# Patient Record
Sex: FEMALE | Race: WHITE | Employment: UNEMPLOYED | URBAN - METROPOLITAN AREA
[De-identification: names, ages, dates, MRNs, and addresses within clinical notes are randomized per-mention and may not be internally consistent; named-entity substitution may affect disease eponyms.]

---

## 2017-06-29 ENCOUNTER — HOSPITAL ENCOUNTER (OUTPATIENT)
Dept: CT IMAGING | Age: 47
Discharge: HOME | End: 2017-06-29
Attending: SURGERY
Payer: COMMERCIAL

## 2017-06-29 DIAGNOSIS — R10.9 CHRONIC ABDOMINAL PAIN: ICD-10-CM

## 2017-06-29 DIAGNOSIS — G89.29 CHRONIC ABDOMINAL PAIN: ICD-10-CM

## 2017-06-29 DIAGNOSIS — Z87.19 H/O DIVERTICULITIS OF COLON: ICD-10-CM

## 2017-06-29 PROCEDURE — 74011000258 HC RX REV CODE- 258: Performed by: SURGERY

## 2017-06-29 PROCEDURE — 74011636320 HC RX REV CODE- 636/320: Performed by: SURGERY

## 2017-06-29 PROCEDURE — 74177 CT ABD & PELVIS W/CONTRAST: CPT

## 2017-06-29 RX ORDER — SODIUM CHLORIDE 0.9 % (FLUSH) 0.9 %
10 SYRINGE (ML) INJECTION
Status: COMPLETED | OUTPATIENT
Start: 2017-06-29 | End: 2017-06-29

## 2017-06-29 RX ADMIN — DIATRIZOATE MEGLUMINE AND DIATRIZOATE SODIUM 15 ML: 660; 100 LIQUID ORAL; RECTAL at 12:03

## 2017-06-29 RX ADMIN — SODIUM CHLORIDE 100 ML: 900 INJECTION, SOLUTION INTRAVENOUS at 12:03

## 2017-06-29 RX ADMIN — Medication 10 ML: at 12:03

## 2017-06-29 RX ADMIN — IOPAMIDOL 100 ML: 755 INJECTION, SOLUTION INTRAVENOUS at 12:03

## 2017-07-28 NOTE — PROGRESS NOTES
Louie Barnes,  Please schedule her for an appointment to discuss this. She said she will try to call on Monday. 7/31 to get an appointment. Thank you.

## 2018-02-21 PROBLEM — F33.1 MODERATE EPISODE OF RECURRENT MAJOR DEPRESSIVE DISORDER (HCC): Status: ACTIVE | Noted: 2018-02-21

## 2018-06-04 PROBLEM — K58.0 IRRITABLE BOWEL SYNDROME WITH DIARRHEA: Status: ACTIVE | Noted: 2018-06-04

## 2018-09-13 ENCOUNTER — HOSPITAL ENCOUNTER (OUTPATIENT)
Dept: CT IMAGING | Age: 48
Discharge: HOME | End: 2018-09-13
Attending: FAMILY MEDICINE
Payer: COMMERCIAL

## 2018-09-13 DIAGNOSIS — R10.10 PAIN OF UPPER ABDOMEN: ICD-10-CM

## 2018-09-13 PROCEDURE — 74011636320 HC RX REV CODE- 636/320: Performed by: FAMILY MEDICINE

## 2018-09-13 PROCEDURE — 74011000258 HC RX REV CODE- 258: Performed by: FAMILY MEDICINE

## 2018-09-13 PROCEDURE — 74177 CT ABD & PELVIS W/CONTRAST: CPT

## 2018-09-13 RX ORDER — SODIUM CHLORIDE 0.9 % (FLUSH) 0.9 %
10 SYRINGE (ML) INJECTION
Status: COMPLETED | OUTPATIENT
Start: 2018-09-13 | End: 2018-09-13

## 2018-09-13 RX ADMIN — SODIUM CHLORIDE 100 ML: 900 INJECTION, SOLUTION INTRAVENOUS at 11:42

## 2018-09-13 RX ADMIN — IOPAMIDOL 100 ML: 755 INJECTION, SOLUTION INTRAVENOUS at 11:42

## 2018-09-13 RX ADMIN — Medication 10 ML: at 11:42

## 2018-09-13 RX ADMIN — DIATRIZOATE MEGLUMINE AND DIATRIZOATE SODIUM 15 ML: 660; 100 LIQUID ORAL; RECTAL at 11:42

## 2018-10-11 PROBLEM — K20.90 ESOPHAGITIS: Status: ACTIVE | Noted: 2018-10-11

## 2018-10-11 PROBLEM — R19.7 DIARRHEA: Status: ACTIVE | Noted: 2018-10-11

## 2018-10-11 PROBLEM — L93.0 LUPUS ERYTHEMATOSUS: Status: ACTIVE | Noted: 2018-10-11

## 2018-10-31 ENCOUNTER — HOSPITAL ENCOUNTER (OUTPATIENT)
Dept: PHYSICAL THERAPY | Age: 48
Discharge: HOME | End: 2018-10-31
Payer: COMMERCIAL

## 2018-10-31 DIAGNOSIS — M79.7 FIBROMYALGIA: Chronic | ICD-10-CM

## 2018-10-31 DIAGNOSIS — R53.1 WEAKNESS GENERALIZED: ICD-10-CM

## 2018-10-31 PROCEDURE — 97162 PT EVAL MOD COMPLEX 30 MIN: CPT

## 2018-10-31 PROCEDURE — 97110 THERAPEUTIC EXERCISES: CPT

## 2018-10-31 NOTE — PROGRESS NOTES
April Pace  : 1970  Primary: [facility]*  Secondary:  [id number] at [facility]  [facility], [place], [address] [place] [address]  [phone]   [phone]       OUTPATIENT PHYSICAL THERAPY:Initial Assessment 10/31/2018    ICD-10: Treatment Diagnosis: R53.1 generalized weakness and M79.7 fibromyalgia and R26.89 other abnormalities of gait and mobility   Precautions: weak quads-falls precautions  Allergies: Sulfa (sulfonamide antibiotics); Ciprofloxacin; Ibuprofen; Penicillins; and Prozac [fluoxetine]   Fall Risk Score: 1 (? 5 = High Risk)  MD Orders: evaluate and treat  MEDICAL/REFERRING DIAGNOSIS:  Weakness generalized [R53.1]  Fibromyalgia [M79.7]  DATE OF ONSET:18-patient got sick at the [address] [place] [address] Street: Hermes Burgos MD  RETURN PHYSICIAN APPOINTMENT:unsure     INITIAL ASSESSMENT:  Ms. Uche Pulido is a 53 y.o. female presenting to physical therapy with complaints of generalized weakness but especially with weak legs and fear of falling-[place] for the last 3 1/2 months she has been sick in her stomach with nausea and diarrhea-main complaint is stomach discomfort and fibromyalgia pain in her neck and scapular region-fibro pain is 8/10 at this time but her main concern is her weakness-history of low potassium with her diarrhea issues -unable to keep food down and patient has lost 32 lbs since mid July -LE range of motion is wfl -trunk range of motion is limited in flexion with tight hamstrings and low back soreness-very good candidate for skilled physical therapy to push LE exercises     PROBLEM LIST (Impacting functional limitations):  1. Decreased Strength  2. Decreased ADL/Functional Activities  3. Decreased Transfer Abilities  4. Decreased Ambulation Ability/Technique  5. Decreased Balance  6. Increased Pain  7. Decreased Activity Tolerance  8.  Decreased Pacing Skills  9. Decreased Flexibility/Joint Mobility INTERVENTIONS PLANNED:  1. Manual Therapy  2. Therapeutic Exercise/Strengthening   TREATMENT PLAN:  Effective Dates: 10/31/2018 TO 12/30/2018 (60 days). Frequency/Duration: 2 times a week for 60 Days  GOALS: (Goals have been discussed and agreed upon with patient.)  Short-Term Functional Goals: Time Frame: 11-14-18  1. Pain level is less than 8/10 in scapular region  2. Quad and hamstring strength is improved to 3+/5   Instruction in exercise program to allow increased ADLs. Discharge Goals: Time Frame:12-30-18   1. Neck and shoulder pain is 4/10 with consistent stretching to allow increased home ADLs   2. B quad and hamstring strength is 4-/5 to allow increased home chores and caring for her daughter who has seizures   3. Independent ambulation with no antalgic gait to allow walking community distances  4. Able to stand/walk for more than 1 hour   5. LEFS score is improved from 24/80 to 48/80  Rehabilitation Potential For Stated Goals: Good  Regarding April Gregory's therapy, I certify that the treatment plan above will be carried out by a therapist or under their direction. Thank you for this referral,  Rob Patel PT     Referring Physician Signature: Paige Chapa MD              Date                    The information in this section was collected on 10-31-18 (except where otherwise noted).   HISTORY:   History of Present Injury/Illness (Reason for Referral):  3.5 months of sick to stomach with diarrhea and queasiness has left patient very weak -she has lost 32 lbs per patient -history of diverticulitis  Past Medical History/Comorbidities:   Ms. Delfin Taylor  has a past medical history of Attention deficit disorder without mention of hyperactivity, Bronchitis, Chronic obstructive pulmonary disease ([facility] [address] Depressive disorder, not elsewhere classified, Diaphragmatic hernia without mention of obstruction or gangrene, Diverticulitis of colon (without mention of hemorrhage)(562.11), Endometriosis, Esophageal reflux, Fibromyalgia, Folate deficiency, Generalized hypermobility of joints, GERD (gastroesophageal reflux disease), Hypertension, Hypothyroidism, Irritable bowel syndrome, Leg pain, bilateral, Lupus, Migraine, unspecified, without mention of intractable migraine without mention of status migrainosus, Regional enteritis of large intestine ([facility] [address] Tobacco use disorder, and Vitamin D deficiency. Ms. Agustin Gaucher  has a past surgical history that includes hx lap cholecystectomy (01/04/2017); hx hysterectomy (4/2014); hx colonoscopy (12/2016); hx colonoscopy (01/23/2018); and hx endoscopy (01/23/2018). Social History/Living Environment:   Home Environment: Private residence  Wheelchair Ramp: No  One/Two Story Residence: One story  Living Alone: No  Support Systems: Spouse/Significant Other/Partner  Patient Expects to be Discharged to[de-identified] Private residence  Current DME Used/Available at Home: None  Tub or Shower Type: Tub/Shower combination  Prior Level of Function/Work/Activity:  Independent with home ADLs and walking   Current Medications:       Current Outpatient Medications:     sucralfate (CARAFATE) 1 gram tablet, Dissolve 1 tab in 10ml of water and take PO 4 times every day on an empty stomach 1 hour before meals and at bedtime, Disp: , Rfl:     HYDROcodone-acetaminophen (NORCO)  mg tablet, Take 1 Tab by mouth every eight (8) hours as needed for Pain. Max Daily Amount: 3 Tabs., Disp: 90 Tab, Rfl: 0    dextroamphetamine-amphetamine (ADDERALL) 30 mg tablet, Take 1 Tab by mouth two (2) times a dayEarliest Fill Date: 10/11/18. Max Daily Amount: 2 Tabs, Disp: 60 Tab, Rfl: 0    hyoscyamine SL (LEVSIN/SL) 0.125 mg SL tablet, Take 1 Tab by mouth every six (6) hours as needed for Cramping., Disp: 120 Tab, Rfl: 0    sertraline (ZOLOFT) 50 mg tablet, Take 1 Tab by mouth daily. , Disp: 30 Tab, Rfl: 3    SPIRIVA RESPIMAT 2.5 mcg/actuation inhaler, , Disp: , Rfl:    cyanocobalamin (VITAMIN B12) 1,000 mcg/mL injection, Inject as directed., Disp: 1 Vial, Rfl: 11    Syringe with Needle, Disp, 3 mL 25 gauge x 1\" syrg, Use for B12 shot once a month, Disp: 15 Syringe, Rfl: 0    amLODIPine-benazepril (LOTREL) 10-20 mg per capsule, Take 1 Cap by mouth daily. , Disp: 30 Cap, Rfl: 6    omeprazole (PRILOSEC) 20 mg capsule, TAKE ONE CAPSULE BY MOUTH ONCE DAILY, Disp: 90 Cap, Rfl: 2    carisoprodol (SOMA) 350 mg tablet, Take 1 Tab by mouth every eight (8) hours as needed for Muscle Spasm(s). Max Daily Amount: 1,050 mg., Disp: 90 Tab, Rfl: 0    magnesium oxide (MAG-OX) 400 mg tablet, TAKE ONE TABLET BY MOUTH ONCE DAILY, Disp: 30 Tab, Rfl: 0    promethazine (PHENERGAN) 25 mg tablet, Take 1 Tab by mouth every six (6) hours as needed for Nausea., Disp: 60 Tab, Rfl: 0    folic acid (FOLVITE) 1 mg tablet, Take 1 Tab by mouth daily. Indications: FOLATE DEFICIENCY, Disp: 90 Tab, Rfl: 3    B.infantis-B.ani-B.long-B.bifi (PROBIOTIC 4X) 10-15 mg TbEC, Take  by mouth., Disp: , Rfl:     glucose blood VI test strips (ONETOUCH ULTRA TEST) strip, Testing 2 times daily, Disp: 100 Strip, Rfl: 11    aspirin delayed-release 81 mg tablet, Take  by mouth daily. , Disp: , Rfl:     multivitamin (ONE A DAY) tablet, Take 1 Tab by mouth daily. , Disp: , Rfl:    Date Last Reviewed:  10/31/2018   Number of Personal Factors/Comorbidities that affect the Plan of Care:  (patient has a history of GERD, fibromyalgia, diverticulitis, and lupus) 1-2: MODERATE COMPLEXITY   EXAMINATION:   Observation/Orthostatic Postural Assessment:          Patient ambulates into dept independently with rounded shoulders and forward head on neck posture with flattened lumbar curve  Palpation:          Tight B lumbar paraspinals and point tenderness in B gluteus medius -decreased tone in gastrocsoleus /quad/hamstring musculature  ROM:        L;E range is wfl     Trunk range of motion is wfl with 75% trunk flexion due to tight hamstrings and increased low back soreness  Strength: Ankles are 4-/5    B quads and hamstrings are 3/5 -FALLS PRECAUTIONS     B hip flexors are 4-/5      Special Tests:         Negative spring/compression/valsalva/stork/standing flexion and negative straight leg raise test    Neurological Screen:    No radiating pain or tingling or numbness into LE's    Balance:          Good in sitting    Good- in standing     Mental Status:          Alert and oriented but stressed at this time (her daughter needs 24/7 supervision due to seizure activity)  Sensation:        Intact to light touch    Body Structures Involved:  1. Bones  2. Joints  3. Muscles Body Functions Affected:  1. Sensory/Pain  2. Neuromusculoskeletal  3. Movement Related Activities and Participation Affected:  1. Learning and Applying Knowledge  2. General Tasks and Demands  3. Mobility  4. Self Care  5. Domestic Life   Number of elements (examined above) that affect the Plan of Care: 3: MODERATE COMPLEXITY   CLINICAL PRESENTATION:   Presentation: Evolving clinical presentation with changing clinical characteristics: MODERATE COMPLEXITY   CLINICAL DECISION MAKING:   Outcome Measure: Tool Used: Lower Extremity Functional Scale (LEFS)  Score:  Initial: 24/80 Most Recent: X/80 (Date: -- )   Interpretation of Score: 20 questions each scored on a 5 point scale with 0 representing \"extreme difficulty or unable to perform\" and 4 representing \"no difficulty\". The lower the score, the greater the functional disability. 80/80 represents no disability. Minimal detectable change is 9 points. Score 80 79-63 62-48 47-32 31-16 15-1 0   Modifier CH CI CJ CK CL CM CN         Medical Necessity:   · Patient is expected to demonstrate progress in strength, range of motion, balance and coordination to increase independence with ADLs and improve safety during walking and transfers.   · Skilled intervention continues to be required due to B LE weakness is affecting function and gait .  Reason for Services/Other Comments:  · Patient continues to require modification of therapeutic interventions to increase complexity of exercises. Use of outcome tool(s) and clinical judgement create a POC that gives a:  (outcome measure illustrates up to 79% impairment) Questionable prediction of patient's progress: MODERATE COMPLEXITY            TREATMENT:   (In addition to Assessment/Re-Assessment sessions the following treatments were rendered)  Pre-treatment Symptoms/Complaints:  I took some medication to help me stop smoking in July and I wondered if that started the stomach issues -I can not keep food down an dI have lost weight and my potassium levels have dropped a lot   Pain: Initial:   Pain Intensity 1: 8  Pain Location 1: Spine, cervical, Spine, thoracic  Pain Orientation 1: Mid, Left, Upper, Right  Pain Intervention(s) 1: Exercise  Post Session: 7-8/10 in scapular region from fibromyalgia but her main concern is LE weakness-performed exercises well      Therapeutic Exercise: ( ):  x 40 minutes      Exercises per grid below to improve mobility, strength, balance and coordination. Required minimal verbal cues to promote proper body alignment and promote proper body posture. Progressed resistance, range, repetitions and complexity of movement as indicated.    Date:  10-31-18 Date:   Date:     Activity/Exercise Parameters Parameters Parameters   Gastrocnemius stretch 4 x 30 seconds by therapist     Hamstring stretch 2 x 60 second with belt     Piriformis stretch 2 x 30 seconds     Ankle pumps 2 x 20     Quad sets 2 x 10 with 3 second hold     Gluteal sets X 10 with 3 second hold     Straight leg raises X 10 to each LE       Hip adduction X 10 with yellow theraball     Hip abduction X 20 with black band     Long arc quads X 20 to each LE with 2.5 lb       Seated hamstring curls with eccentric knee extension X 20 to each LE with red band     Marching in place X 10 to each LE with 2.5 lb Standing hamstring curls X 20 to each LE with 2.5 lb     Sit to stand 2 x 5             Manual Therapy (          Level pelvis    Therapeutic Modalities: for pain and edema                                                                                               HEP: As above; handouts given to patient for all exercises. Treatment/Session Assessment:    · Response to Treatment:  Performed exercises well . · Compliance with Program/Exercises: compliant most of the time. · Recommendations/Intent for next treatment session: \"Next visit will focus on advancements to more challenging activities\". push LE strengthening /aerobic work     Total Treatment Duration: exercises x 40 minutes and evaluation x 28 minutes -total treatment time was 68 minutes   PT Patient Time In/Time Out  Time In: 1000  Time Out: EVERETTE Taylor

## 2018-10-31 NOTE — PROGRESS NOTES
Ambulatory/Rehab Services H2 Model Falls Risk Assessment    Risk Factor Pts. ·   Confusion/Disorientation/Impulsivity  []    4 ·   Symptomatic Depression  []   2 ·   Altered Elimination  []   1 ·   Dizziness/Vertigo  []   1 ·   Gender (Male)  []   1 ·   Any administered antiepileptics (anticonvulsants):  []   2 ·   Any administered benzodiazepines:  []   1 ·   Visual Impairment (specify):  []   1 ·   Portable Oxygen Use  []   1 ·   Orthostatic ? BP  []   1 ·   History of Recent Falls (within 3 mos.)  []   5     Ability to Rise from Chair (choose one) Pts. ·   Ability to rise in a single movement  []   0 ·   Pushes up, successful in one attempt  [x]   1 ·   Multiple attempts, but successful  []   3 ·   Unable to rise without assistance  []   4   Total: (5 or greater = High Risk) 1     Falls Prevention Plan:   []                Physical Limitations to Exercise (specify):   []                Mobility Assistance Device (type):   []                Exercise/Equipment Adaptation (specify):    ©2010 [facility] [address] Patent #[id number].  Federal Law prohibits the replication, distribution or use without written permission from [facility] [employer]

## 2018-11-07 ENCOUNTER — HOSPITAL ENCOUNTER (OUTPATIENT)
Dept: PHYSICAL THERAPY | Age: 48
Discharge: HOME | End: 2018-11-07
Payer: COMMERCIAL

## 2018-11-07 PROCEDURE — 97110 THERAPEUTIC EXERCISES: CPT

## 2018-11-07 NOTE — PROGRESS NOTES
April Pace : 1970 [place]: [place] [employer]* Secondary:  Therapy Center at [address], [place], [address] [place] [address] Street [phone]   [phone] OUTPATIENT PHYSICAL THERAPY:Daily Note 2018 ICD-10: Treatment Diagnosis: R53.1 generalized weakness and M79.7 fibromyalgia and R26.89 other abnormalities of gait and mobility Precautions: weak quads-falls precautions Allergies: Sulfa (sulfonamide antibiotics); Ciprofloxacin; Ibuprofen; Penicillins; and Prozac [fluoxetine] Fall Risk Score: 1 (? 5 = High Risk) MD Orders: evaluate and treat  MEDICAL/REFERRING DIAGNOSIS: 
Weakness [R53.1] DATE OF ONSET:18-[place] REFERRING PHYSICIAN: Ashlee Light 75 INITIAL ASSESSMENT:  Ms. Rolan Cline is a 53 y.o. female presenting to physical therapy with complaints of generalized weakness but especially with weak legs and fear of falling-patient [place] and for the last 3 1/2 months she has been sick in her stomach with nausea and diarrhea-main complaint is stomach discomfort and fibromyalgia pain in her neck and scapular region-fibro pain is 8/10 at this time but her main concern is her weakness-history of low potassium with her diarrhea issues -unable to keep food down and patient has lost 32 lbs since mid July -LE range of motion is wfl -trunk range of motion is limited in flexion with tight hamstrings and low back soreness-very good candidate for skilled physical therapy to push LE exercises PROBLEM LIST (Impacting functional limitations): 1. Decreased Strength 2. Decreased ADL/Functional Activities 3. Decreased Transfer Abilities 4. Decreased Ambulation Ability/Technique 5. Decreased Balance 6. Increased Pain 7. Decreased Activity Tolerance 8. Decreased Pacing Skills 9. Decreased Flexibility/Joint Mobility INTERVENTIONS PLANNED: 
1. Manual Therapy 2. Therapeutic Exercise/Strengthening TREATMENT PLAN: 
Effective Dates: 10/31/2018 TO 12/30/2018 (60 days). Frequency/Duration: 2 times a week for 60 Days GOALS: (Goals have been discussed and agreed upon with patient.) Short-Term Functional Goals: Time Frame: 11-14-18 1. Pain level is less than 8/10 in scapular region 2. Quad and hamstring strength is improved to 3+/5 Instruction in exercise program to allow increased ADLs. Discharge Goals: Time Frame:12-30-18 1. Neck and shoulder pain is 4/10 with consistent stretching to allow increased home ADLs 2. B quad and hamstring strength is 4-/5 to allow increased home chores and caring for her daughter who has seizures 3. Independent ambulation with no antalgic gait to allow walking community distances 4. Able to stand/walk for more than 1 hour 5. LEFS score is improved from 24/80 to 48/80 Rehabilitation Potential For Stated Goals: Good Regarding April Gregory's therapy, I certify that the treatment plan above will be carried out by a therapist or under their direction. Thank you for this referral, Ney Benz PT Referring Physician Signature: Seth Mcallister MD            Date The information in this section was collected on 10-31-18 (except where otherwise noted). HISTORY:  
History of Present Injury/Illness (Reason for Referral): 
3.5 months of sick to stomach with diarrhea and queasiness has left patient very weak -she has lost 32 lbs per patient -history of diverticulitis Past Medical History/Comorbidities: Ms. Panda Heller  has a past medical history of Attention deficit disorder without mention of hyperactivity, Bronchitis, Chronic obstructive pulmonary disease ([facility] [address] Depressive disorder, not elsewhere classified, Diaphragmatic hernia without mention of obstruction or gangrene, Diverticulitis of colon (without mention of hemorrhage)(562.11), Endometriosis, Esophageal reflux, Fibromyalgia, Folate deficiency, Generalized hypermobility of joints, GERD (gastroesophageal reflux disease), Hypertension, Hypothyroidism, Irritable bowel syndrome, Leg pain, bilateral, Lupus, Migraine, unspecified, without mention of intractable migraine without mention of status migrainosus, Regional enteritis of large intestine ([facility] [address] Tobacco use disorder, and Vitamin D deficiency. Ms. Angela Albarran  has a past surgical history that includes hx lap cholecystectomy (01/04/2017); hx hysterectomy (4/2014); hx colonoscopy (12/2016); hx colonoscopy (01/23/2018); and hx endoscopy (01/23/2018). Social History/Living Environment:  
  
Prior Level of Function/Work/Activity: 
Independent with home ADLs and walking Current Medications:   
  
Current Outpatient Medications:  
  sucralfate (CARAFATE) 1 gram tablet, Dissolve 1 tab in 10ml of water and take PO 4 times every day on an empty stomach 1 hour before meals and at bedtime, Disp: , Rfl:  
  HYDROcodone-acetaminophen (NORCO)  mg tablet, Take 1 Tab by mouth every eight (8) hours as needed for Pain. Max Daily Amount: 3 Tabs., Disp: 90 Tab, Rfl: 0 
  dextroamphetamine-amphetamine (ADDERALL) 30 mg tablet, Take 1 Tab by mouth two (2) times a dayEarliest Fill Date: 10/11/18. Max Daily Amount: 2 Tabs, Disp: 60 Tab, Rfl: 0 
  hyoscyamine SL (LEVSIN/SL) 0.125 mg SL tablet, Take 1 Tab by mouth every six (6) hours as needed for Cramping., Disp: 120 Tab, Rfl: 0 
  sertraline (ZOLOFT) 50 mg tablet, Take 1 Tab by mouth daily. , Disp: 30 Tab, Rfl: 3 
  SPIRIVA RESPIMAT 2.5 mcg/actuation inhaler, , Disp: , Rfl:  
  cyanocobalamin (VITAMIN B12) 1,000 mcg/mL injection, Inject as directed., Disp: 1 Vial, Rfl: 11   Syringe with Needle, Disp, 3 mL 25 gauge x 1\" syrg, Use for B12 shot once a month, Disp: 15 Syringe, Rfl: 0 
  amLODIPine-benazepril (LOTREL) 10-20 mg per capsule, Take 1 Cap by mouth daily. , Disp: 30 Cap, Rfl: 6   omeprazole (PRILOSEC) 20 mg capsule, TAKE ONE CAPSULE BY MOUTH ONCE DAILY, Disp: 90 Cap, Rfl: 2   carisoprodol (SOMA) 350 mg tablet, Take 1 Tab by mouth every eight (8) hours as needed for Muscle Spasm(s). Max Daily Amount: 1,050 mg., Disp: 90 Tab, Rfl: 0 
  magnesium oxide (MAG-OX) 400 mg tablet, TAKE ONE TABLET BY MOUTH ONCE DAILY, Disp: 30 Tab, Rfl: 0 
  promethazine (PHENERGAN) 25 mg tablet, Take 1 Tab by mouth every six (6) hours as needed for Nausea., Disp: 60 Tab, Rfl: 0 
  folic acid (FOLVITE) 1 mg tablet, Take 1 Tab by mouth daily. Indications: FOLATE DEFICIENCY, Disp: 90 Tab, Rfl: 3 
  B.infantis-B.ani-B.long-B.bifi (PROBIOTIC 4X) 10-15 mg TbEC, Take  by mouth., Disp: , Rfl:  
  glucose blood VI test strips (ONETOUCH ULTRA TEST) strip, Testing 2 times daily, Disp: 100 Strip, Rfl: 11 
  aspirin delayed-release 81 mg tablet, Take  by mouth daily. , Disp: , Rfl:  
  multivitamin (ONE A DAY) tablet, Take 1 Tab by mouth daily. , Disp: , Rfl:   
Date Last Reviewed:  11/7/2018 Number of Personal Factors/Comorbidities that affect the Plan of Care: 
(patient has a history of GERD, fibromyalgia, diverticulitis, and lupus) 1-2: MODERATE COMPLEXITY EXAMINATION:  
Observation/Orthostatic Postural Assessment:   
      Patient ambulates into dept independently with rounded shoulders and forward head on neck posture with flattened lumbar curve Palpation:   
      Tight B lumbar paraspinals and point tenderness in B gluteus medius -decreased tone in gastrocsoleus /quad/hamstring musculature ROM:   
    L;E range is wfl  
 
Trunk range of motion is wfl with 75% trunk flexion due to tight hamstrings and increased low back soreness Strength: Ankles are 4-/5 B quads and hamstrings are 3/5 -FALLS PRECAUTIONS B hip flexors are 4-/5 Special Tests:   
     Negative spring/compression/valsalva/stork/standing flexion and negative straight leg raise test 
 
Neurological Screen: No radiating pain or tingling or numbness into LE's Balance:   
      Good in sitting Good- in standing Mental Status:   
      Alert and oriented but stressed at this time (her daughter needs 24/7 supervision due to seizure activity) Sensation:  
     Intact to light touch Body Structures Involved: 1. Bones 2. Joints 3. Muscles Body Functions Affected: 1. Sensory/Pain 2. Neuromusculoskeletal 
3. Movement Related Activities and Participation Affected: 1. Learning and Applying Knowledge 2. General Tasks and Demands 3. Mobility 4. Self Care 5. Domestic Life Number of elements (examined above) that affect the Plan of Care: 3: MODERATE COMPLEXITY CLINICAL PRESENTATION:  
Presentation: Evolving clinical presentation with changing clinical characteristics: MODERATE COMPLEXITY CLINICAL DECISION MAKING:  
Outcome Measure: Tool Used: Lower Extremity Functional Scale (LEFS) Score:  Initial: 24/80 Most Recent: X/80 (Date: -- ) Interpretation of Score: 20 questions each scored on a 5 point scale with 0 representing \"extreme difficulty or unable to perform\" and 4 representing \"no difficulty\". The lower the score, the greater the functional disability. 80/80 represents no disability. Minimal detectable change is 9 points. Score 80 79-63 62-48 47-32 31-16 15-1 0 Modifier CH CI CJ CK CL CM CN Medical Necessity:  
· Patient is expected to demonstrate progress in strength, range of motion, balance and coordination to increase independence with ADLs and improve safety during walking and transfers. · Skilled intervention continues to be required due to B LE weakness is affecting function and gait . Reason for Services/Other Comments: 
· Patient continues to require modification of therapeutic interventions to increase complexity of exercises.   
Use of outcome tool(s) and clinical judgement create a POC that gives a: 
 (outcome measure illustrates up to 79% impairment) Questionable prediction of patient's progress: MODERATE COMPLEXITY  
  
 
 
 
TREATMENT:  
(In addition to Assessment/Re-Assessment sessions the following treatments were rendered) Pre-treatment Symptoms/Complaints: My fibromyalgia in my neck and shoulders is killing me at a level of 9/10 but I have been doing my exercises to try and get stronger-I have not eaten or slept all day Pain: Initial:  
Pain Intensity 1: 8 Pain Location 1: Spine, cervical, Spine, thoracic Pain Orientation 1: Mid, Left, Right Pain Intervention(s) 1: Exercise  Post Session: 9/10 in scapular region  from fibromyalgia but her main concern is LE weakness-performed increased exercises  exercises well Therapeutic Exercise: ( ):  x 60 minutes Exercises per grid below to improve mobility, strength, balance and coordination. Required minimal verbal cues to promote proper body alignment and promote proper body posture. Progressed resistance, range, repetitions and complexity of movement as indicated. Date: 
10-31-18 Date: 
11-7-18 Date: Activity/Exercise Parameters Parameters Parameters Gastrocnemius stretch 4 x 30 seconds by therapist 2 x 30 second to each LE Hamstring stretch 2 x 60 second with belt 2 X 30 seconds with belt to each LE     
Knee to chest  2 x 30 seconds to each LE    
Piriformis stretch 2 x 30 seconds 2 x 30 seconds Ankle pumps 2 x 20 2 x 20 Quad sets 2 x 10 with 3 second hold 2 x 10 with 3 second hold Gluteal sets X 10 with 3 second hold X 10 with 3 second hold Straight leg raises X 10 to each LE   X 10 to each LE with 2.5 lb Hip adduction X 10 with yellow theraball 2 x 10 with theraball Hip abduction X 20 with black band 2 x 10 with black band Long arc quads X 20 to each LE with 2.5 lb   2 x 10 to each LE with 2.5 lb Seated hamstring curls with eccentric knee extension X 20 to each LE with red band 2 x 10 to each LE with red band Marching in place X 10 to each LE with 2.5 lb 2 x 10 to each LE with 2.5 lb Standing hamstring curls X 20 to each LE with 2.5 lb 2 x 10 to each LE with 2.5 lbs Sit to stand 2 x 5 2 x 5 Standing hip flexion  2 x 10 to each LE with 2.5 lb Standing hip abduction  2 x 10 to each LE with 2.5 lb Standing hip extension  2 x 10 to each LE with 2.5 lbs Nu step  X 5 minutes at level 1 Manual Therapy (       
 
Level pelvis Therapeutic Modalities: for pain and edema HEP: As above; handouts given to patient for all exercises. Treatment/Session Assessment:   
· Response to Treatment:  Performed exercises well including nu step . · BP  And HR after exercises was 124/87   99 · Compliance with Program/Exercises: compliant most of the time. · Recommendations/Intent for next treatment session: \"Next visit will focus on advancements to more challenging activities\". push LE strengthening /aerobic work -add scapular muscle stimulation with heat if fibro pain increases Total Treatment Duration: 60 minutes PT Patient Time In/Time Out Time In: 3292 Time Out: 1630 Taiwo Ryan, PT

## 2018-11-13 ENCOUNTER — HOSPITAL ENCOUNTER (OUTPATIENT)
Dept: PHYSICAL THERAPY | Age: 48
Discharge: HOME | End: 2018-11-13
Payer: COMMERCIAL

## 2018-11-13 PROCEDURE — 97014 ELECTRIC STIMULATION THERAPY: CPT

## 2018-11-13 PROCEDURE — 97110 THERAPEUTIC EXERCISES: CPT

## 2018-11-13 NOTE — PROGRESS NOTES
April Pace : 1970 Primary: [place] [employer]* Secondary:  [facility]rapy Center at [address], [place], [address] [place] [address] Street [phone]   [phone] OUTPATIENT PHYSICAL THERAPY:Daily Note 2018 ICD-10: Treatment Diagnosis: R53.1 generalized weakness and M79.7 fibromyalgia and R26.89 other abnormalities of gait and mobility Precautions: weak quads-falls precautions Allergies: Sulfa (sulfonamide antibiotics); Ciprofloxacin; Ibuprofen; Penicillins; and Prozac [fluoxetine] Fall Risk Score: 1 (? 5 = High Risk) MD Orders: evaluate and treat  MEDICAL/REFERRING DIAGNOSIS: 
Weakness [R53.1] DATE OF ONSET:18-[place] REFERRING PHYSICIAN: Ashlee Olivas INITIAL ASSESSMENT:  Ms. Slade Whitley is a 53 y.o. female presenting to physical therapy with complaints of generalized weakness but especially with weak legs and fear of falling-patient [place] and for the last 3 1/2 months she has been sick in her stomach with nausea and diarrhea-main complaint is stomach discomfort and fibromyalgia pain in her neck and scapular region-fibro pain is 8/10 at this time but her main concern is her weakness-history of low potassium with her diarrhea issues -unable to keep food down and patient has lost 32 lbs since mid July -LE range of motion is wfl -trunk range of motion is limited in flexion with tight hamstrings and low back soreness-very good candidate for skilled physical therapy to push LE exercises PROBLEM LIST (Impacting functional limitations): 1. Decreased Strength 2. Decreased ADL/Functional Activities 3. Decreased Transfer Abilities 4. Decreased Ambulation Ability/Technique 5. Decreased Balance 6. Increased Pain 7. Decreased Activity Tolerance 8. Decreased Pacing Skills 9. Decreased Flexibility/Joint Mobility INTERVENTIONS PLANNED: 
1. Manual Therapy 2. Therapeutic Exercise/Strengthening TREATMENT PLAN: 
Effective Dates: 10/31/2018 TO 12/30/2018 (60 days). Frequency/Duration: 2 times a week for 60 Days GOALS: (Goals have been discussed and agreed upon with patient.) Short-Term Functional Goals: Time Frame: 11-14-18 1. Pain level is less than 8/10 in scapular region 2. Quad and hamstring strength is improved to 3+/5 Instruction in exercise program to allow increased ADLs. Discharge Goals: Time Frame:12-30-18 1. Neck and shoulder pain is 4/10 with consistent stretching to allow increased home ADLs 2. B quad and hamstring strength is 4-/5 to allow increased home chores and caring for her daughter who has seizures 3. Independent ambulation with no antalgic gait to allow walking community distances 4. Able to stand/walk for more than 1 hour 5. LEFS score is improved from 24/80 to 48/80 Rehabilitation Potential For Stated Goals: Good Regarding April Gregory's therapy, I certify that the treatment plan above will be carried out by a therapist or under their direction. Thank you for this referral, Haja Alcantar PTA Referring Physician Signature: Jessi Weaver MD            Date The information in this section was collected on 10-31-18 (except where otherwise noted). HISTORY:  
History of Present Injury/Illness (Reason for Referral): 
3.5 months of sick to stomach with diarrhea and queasiness has left patient very weak -she has lost 32 lbs per patient -history of diverticulitis Past Medical History/Comorbidities: Ms. Teresa Polo  has a past medical history of Attention deficit disorder without mention of hyperactivity, Bronchitis, Chronic obstructive pulmonary disease ([facility] [address] Depressive disorder, not elsewhere classified, Diaphragmatic hernia without mention of obstruction or gangrene, Diverticulitis of colon (without mention of hemorrhage)(562.11), Endometriosis, Esophageal reflux, Fibromyalgia, Folate deficiency, Generalized hypermobility of joints, GERD (gastroesophageal reflux disease), Hypertension, Hypothyroidism, Irritable bowel syndrome, Leg pain, bilateral, Lupus, Migraine, unspecified, without mention of intractable migraine without mention of status migrainosus, Regional enteritis of large intestine ([address] Tobacco use disorder, and Vitamin D deficiency. Ms. Farzana Pendleton  has a past surgical history that includes hx lap cholecystectomy (01/04/2017); hx hysterectomy (4/2014); hx colonoscopy (12/2016); hx colonoscopy (01/23/2018); and hx endoscopy (01/23/2018). Social History/Living Environment:  
  
Prior Level of Function/Work/Activity: 
Independent with home ADLs and walking Current Medications:   
  
Current Outpatient Medications:   Syringe with Needle, Disp, 3 mL 25 gauge x 1\" syrg, Use for B12 shot once a month, Disp: 15 Syringe, Rfl: 0 
  cyanocobalamin (VITAMIN B12) 1,000 mcg/mL injection, Inject as directed., Disp: 1 Vial, Rfl: 11 
  [START ON 11/18/2018] HYDROcodone-acetaminophen (NORCO)  mg tablet, Take 1 Tab by mouth every eight (8) hours as needed for Pain. Max Daily Amount: 3 Tabs., Disp: 90 Tab, Rfl: 0 
  [START ON 11/18/2018] dextroamphetamine-amphetamine (ADDERALL) 30 mg tablet, Take 1 Tab by mouth two (2) times a dayEarliest Fill Date: 11/18/18. Max Daily Amount: 2 Tabs, Disp: 60 Tab, Rfl: 0 
  sucralfate (CARAFATE) 1 gram tablet, Dissolve 1 tab in 10ml of water and take PO 4 times every day on an empty stomach 1 hour before meals and at bedtime, Disp: , Rfl:  
  hyoscyamine SL (LEVSIN/SL) 0.125 mg SL tablet, Take 1 Tab by mouth every six (6) hours as needed for Cramping., Disp: 120 Tab, Rfl: 0 
  sertraline (ZOLOFT) 50 mg tablet, Take 1 Tab by mouth daily. , Disp: 30 Tab, Rfl: 3 
  SPIRIVA RESPIMAT 2.5 mcg/actuation inhaler, , Disp: , Rfl:  
   amLODIPine-benazepril (LOTREL) 10-20 mg per capsule, Take 1 Cap by mouth daily. , Disp: 30 Cap, Rfl: 6 
  omeprazole (PRILOSEC) 20 mg capsule, TAKE ONE CAPSULE BY MOUTH ONCE DAILY, Disp: 90 Cap, Rfl: 2   carisoprodol (SOMA) 350 mg tablet, Take 1 Tab by mouth every eight (8) hours as needed for Muscle Spasm(s). Max Daily Amount: 1,050 mg., Disp: 90 Tab, Rfl: 0 
  magnesium oxide (MAG-OX) 400 mg tablet, TAKE ONE TABLET BY MOUTH ONCE DAILY, Disp: 30 Tab, Rfl: 0 
  promethazine (PHENERGAN) 25 mg tablet, Take 1 Tab by mouth every six (6) hours as needed for Nausea., Disp: 60 Tab, Rfl: 0 
  folic acid (FOLVITE) 1 mg tablet, Take 1 Tab by mouth daily. Indications: FOLATE DEFICIENCY, Disp: 90 Tab, Rfl: 3 
  B.infantis-B.ani-B.long-B.bifi (PROBIOTIC 4X) 10-15 mg TbEC, Take  by mouth., Disp: , Rfl:  
  glucose blood VI test strips (ONETOUCH ULTRA TEST) strip, Testing 2 times daily, Disp: 100 Strip, Rfl: 11 
  aspirin delayed-release 81 mg tablet, Take  by mouth daily. , Disp: , Rfl:  
  multivitamin (ONE A DAY) tablet, Take 1 Tab by mouth daily. , Disp: , Rfl:   
Date Last Reviewed:  11/13/2018 Number of Personal Factors/Comorbidities that affect the Plan of Care: 
(patient has a history of GERD, fibromyalgia, diverticulitis, and lupus) 1-2: MODERATE COMPLEXITY EXAMINATION:  
Observation/Orthostatic Postural Assessment:   
      Patient ambulates into dept independently with rounded shoulders and forward head on neck posture with flattened lumbar curve Palpation:   
      Tight B lumbar paraspinals and point tenderness in B gluteus medius -decreased tone in gastrocsoleus /quad/hamstring musculature ROM:   
    L;E range is wfl  
 
Trunk range of motion is wfl with 75% trunk flexion due to tight hamstrings and increased low back soreness Strength: Ankles are 4-/5 B quads and hamstrings are 3/5 -FALLS PRECAUTIONS B hip flexors are 4-/5 Special Tests: Negative spring/compression/valsalva/stork/standing flexion and negative straight leg raise test 
 
Neurological Screen: No radiating pain or tingling or numbness into LE's Balance:   
      Good in sitting Good- in standing Mental Status:   
      Alert and oriented but stressed at this time (her daughter needs 24/7 supervision due to seizure activity) Sensation:  
     Intact to light touch Body Structures Involved: 1. Bones 2. Joints 3. Muscles Body Functions Affected: 1. Sensory/Pain 2. Neuromusculoskeletal 
3. Movement Related Activities and Participation Affected: 1. Learning and Applying Knowledge 2. General Tasks and Demands 3. Mobility 4. Self Care 5. Domestic Life Number of elements (examined above) that affect the Plan of Care: 3: MODERATE COMPLEXITY CLINICAL PRESENTATION:  
Presentation: Evolving clinical presentation with changing clinical characteristics: MODERATE COMPLEXITY CLINICAL DECISION MAKING:  
Outcome Measure: Tool Used: Lower Extremity Functional Scale (LEFS) Score:  Initial: 24/80 Most Recent: X/80 (Date: -- ) Interpretation of Score: 20 questions each scored on a 5 point scale with 0 representing \"extreme difficulty or unable to perform\" and 4 representing \"no difficulty\". The lower the score, the greater the functional disability. 80/80 represents no disability. Minimal detectable change is 9 points. Score 80 79-63 62-48 47-32 31-16 15-1 0 Modifier CH CI CJ CK CL CM CN Medical Necessity:  
· Patient is expected to demonstrate progress in strength, range of motion, balance and coordination to increase independence with ADLs and improve safety during walking and transfers. · Skilled intervention continues to be required due to B LE weakness is affecting function and gait . Reason for Services/Other Comments: 
· Patient continues to require modification of therapeutic interventions to increase complexity of exercises. Use of outcome tool(s) and clinical judgement create a POC that gives a: 
(outcome measure illustrates up to 79% impairment) Questionable prediction of patient's progress: MODERATE COMPLEXITY  
  
 
 
 
TREATMENT:  
(In addition to Assessment/Re-Assessment sessions the following treatments were rendered) Pre-treatment Symptoms/Complaints: Pt. Stated she has not left her house in months due to her daughter having repeated seizures all day long. Pain: Initial:  
Pain Intensity 1: 9 Pain Location 1: Spine, cervical 
Pain Orientation 1: Left, Mid, Right Pain Intervention(s) 1: Exercise, Heat applied  Post Session: 8/10 less tightness Therapeutic Exercise: (45 Minutes) Exercises per grid below to improve mobility, strength, balance and coordination. Required minimal verbal cues to promote proper body alignment and promote proper body posture. Progressed resistance, range, repetitions and complexity of movement as indicated. Date: 
10-31-18 Date: 
11-7-18 Date: 
11/13/18 Activity/Exercise Parameters Parameters Parameters Gastrocnemius stretch 4 x 30 seconds by therapist 2 x 30 second to each LE 4x30 sec hold Hamstring stretch 2 x 60 second with belt 2 X 30 seconds with belt to each LE   4x30 sec hold strap Knee to chest  2 x 30 seconds to each LE  4x30 sec hold Piriformis stretch 2 x 30 seconds 2 x 30 seconds 4x30 sec hold Ankle pumps 2 x 20 2 x 20 ------  
Quad sets 2 x 10 with 3 second hold 2 x 10 with 3 second hold X 20 reps 5 sec hold Gluteal sets X 10 with 3 second hold X 10 with 3 second hold X 20 reps 5 sec hold Straight leg raises X 10 to each LE   X 10 to each LE with 2.5 lb ------ Hip adduction X 10 with yellow theraball 2 x 10 with theraball X 20 reps x 10 sec hold Hip abduction X 20 with black band 2 x 10 with black band X 20 reps black band Long arc quads X 20 to each LE with 2.5 lb   2 x 10 to each LE with 2.5 lb 2x10 reps each LE with 2.5 lb wt. s Seated hamstring curls with eccentric knee extension X 20 to each LE with red band 2 x 10 to each LE with red band X 20 reps red band 5 sec hold Marching in place X 10 to each LE with 2.5 lb 2 x 10 to each LE with 2.5 lb 2x10 reps each 5 sec hold each Standing hamstring curls X 20 to each LE with 2.5 lb 2 x 10 to each LE with 2.5 lbs 2x10 reps each LE  With 2.5 lb wt.s Sit to stand 2 x 5 2 x 5 X 20 reps chair with blue airex Standing hip flexion  2 x 10 to each LE with 2.5 lb X 20 reps 2.5 lb wt. s Standing hip abduction  2 x 10 to each LE with 2.5 lb 2x10 reps BLE's 2,5 lb wt. s Standing hip extension  2 x 10 to each LE with 2.5 lbs ----- Nu step  X 5 minutes at level 1  X 8 mins level 4 Manual Therapy (      ) Therapeutic Modalities: for pain and edema Cervical Spine Heat Type: Moist pack Duration: 15 minutes Patient Position: Sitting                             Cervical Spine Electrical Stimulation Type: Interferential 
Placement: bilateral cervical paraspinals # of Electrodes: 4 Duration : 15 minutes Patient Position: Sitting HEP: As above; handouts given to patient for all exercises. Treatment/Session Assessment:   
· Response to Treatment:  Pt. Was compliant with all exercises with verbal and tactile cuing for proper technique and body mechanics. · BP  And HR after exercises was 124/87   99 · Compliance with Program/Exercises: compliant most of the time. Recommendations/Intent for next treatment session: \"Next visit will focus on advancements to more challenging activities\". Total Treatment Duration: 60 minutes PT Patient Time In/Time Out Time In: 1430 Time Out: 1530 Edward Chacon PTA

## 2018-11-14 ENCOUNTER — HOSPITAL ENCOUNTER (OUTPATIENT)
Dept: PHYSICAL THERAPY | Age: 48
Discharge: HOME | End: 2018-11-14
Payer: COMMERCIAL

## 2018-11-14 PROCEDURE — 97110 THERAPEUTIC EXERCISES: CPT

## 2018-11-14 PROCEDURE — 97014 ELECTRIC STIMULATION THERAPY: CPT

## 2018-11-14 NOTE — PROGRESS NOTES
April Pace : 1970 [place]: [place] [employer]* Secondary:  Therapy Center at [address], [facility],  [address] [phone]   [phone] OUTPATIENT PHYSICAL THERAPY:Daily Note 2018 ICD-10: Treatment Diagnosis: R53.1 generalized weakness and M79.7 fibromyalgia and R26.89 other abnormalities of gait and mobility Precautions: weak quads-falls precautions Allergies: Sulfa (sulfonamide antibiotics); Ciprofloxacin; Ibuprofen; Penicillins; and Prozac [fluoxetine] Fall Risk Score: 1 (? 5 = High Risk) MD Orders: evaluate and treat  MEDICAL/REFERRING DIAGNOSIS: 
Weakness [R53.1] DATE OF ONSET:18-[place] REFERRING PHYSICIAN: Ashlee Ugalde 76 INITIAL ASSESSMENT:  Ms. Fernanda Simpson is a 53 y.o. female presenting to physical therapy with complaints of generalized weakness but especially with weak legs and fear of falling-patient [place] and for the last 3 1/2 months she has been sick in her stomach with nausea and diarrhea-main complaint is stomach discomfort and fibromyalgia pain in her neck and scapular region-fibro pain is 8/10 at this time but her main concern is her weakness-history of low potassium with her diarrhea issues -unable to keep food down and patient has lost 32 lbs since mid July -LE range of motion is wfl -trunk range of motion is limited in flexion with tight hamstrings and low back soreness-very good candidate for skilled physical therapy to push LE exercises PROBLEM LIST (Impacting functional limitations): 1. Decreased Strength 2. Decreased ADL/Functional Activities 3. Decreased Transfer Abilities 4. Decreased Ambulation Ability/Technique 5. Decreased Balance 6. Increased Pain 7. Decreased Activity Tolerance 8. Decreased Pacing Skills 9. Decreased Flexibility/Joint Mobility INTERVENTIONS PLANNED: 
1. Manual Therapy 2. Therapeutic Exercise/Strengthening TREATMENT PLAN: 
Effective Dates: 10/31/2018 TO 12/30/2018 (60 days). Frequency/Duration: 2 times a week for 60 Days GOALS: (Goals have been discussed and agreed upon with patient.) Short-Term Functional Goals: Time Frame: 11-14-18 1. Pain level is less than 8/10 in scapular region 2. Quad and hamstring strength is improved to 3+/5 Instruction in exercise program to allow increased ADLs. Discharge Goals: Time Frame:12-30-18 1. Neck and shoulder pain is 4/10 with consistent stretching to allow increased home ADLs 2. B quad and hamstring strength is 4-/5 to allow increased home chores and caring for her daughter who has seizures 3. Independent ambulation with no antalgic gait to allow walking community distances 4. Able to stand/walk for more than 1 hour 5. LEFS score is improved from 24/80 to 48/80 Rehabilitation Potential For Stated Goals: Good Regarding April Gregory's therapy, I certify that the treatment plan above will be carried out by a therapist or under their direction. Thank you for this referral, Rob Patel PT Referring Physician Signature: Paige Chapa MD            Date The information in this section was collected on 10-31-18 (except where otherwise noted). HISTORY:  
History of Present Injury/Illness (Reason for Referral): 
3.5 months of sick to stomach with diarrhea and queasiness has left patient very weak -she has lost 32 lbs per patient -history of diverticulitis Past Medical History/Comorbidities: Ms. Delfin Taylor  has a past medical history of Attention deficit disorder without mention of hyperactivity, Bronchitis, Chronic obstructive pulmonary disease ([facility] [address] Depressive disorder, not elsewhere classified, Diaphragmatic hernia without mention of obstruction or gangrene, Diverticulitis of colon (without mention of hemorrhage)(562.11), Endometriosis, Esophageal reflux, Fibromyalgia, Folate deficiency, Generalized hypermobility of joints, GERD (gastroesophageal reflux disease), Hypertension, Hypothyroidism, Irritable bowel syndrome, Leg pain, bilateral, Lupus, Migraine, unspecified, without mention of intractable migraine without mention of status migrainosus, Regional enteritis of large intestine ([address] Tobacco use disorder, and Vitamin D deficiency. Ms. Starr Small  has a past surgical history that includes hx lap cholecystectomy (01/04/2017); hx hysterectomy (4/2014); hx colonoscopy (12/2016); hx colonoscopy (01/23/2018); and hx endoscopy (01/23/2018). Social History/Living Environment:  
  
Prior Level of Function/Work/Activity: 
Independent with home ADLs and walking Current Medications:   
  
Current Outpatient Medications:   Syringe with Needle, Disp, 3 mL 25 gauge x 1\" syrg, Use for B12 shot once a month, Disp: 15 Syringe, Rfl: 0 
  cyanocobalamin (VITAMIN B12) 1,000 mcg/mL injection, Inject as directed., Disp: 1 Vial, Rfl: 11 
  [START ON 11/18/2018] HYDROcodone-acetaminophen (NORCO)  mg tablet, Take 1 Tab by mouth every eight (8) hours as needed for Pain. Max Daily Amount: 3 Tabs., Disp: 90 Tab, Rfl: 0 
  [START ON 11/18/2018] dextroamphetamine-amphetamine (ADDERALL) 30 mg tablet, Take 1 Tab by mouth two (2) times a dayEarliest Fill Date: 11/18/18. Max Daily Amount: 2 Tabs, Disp: 60 Tab, Rfl: 0 
  sucralfate (CARAFATE) 1 gram tablet, Dissolve 1 tab in 10ml of water and take PO 4 times every day on an empty stomach 1 hour before meals and at bedtime, Disp: , Rfl:  
  hyoscyamine SL (LEVSIN/SL) 0.125 mg SL tablet, Take 1 Tab by mouth every six (6) hours as needed for Cramping., Disp: 120 Tab, Rfl: 0 
  sertraline (ZOLOFT) 50 mg tablet, Take 1 Tab by mouth daily. , Disp: 30 Tab, Rfl: 3 
  SPIRIVA RESPIMAT 2.5 mcg/actuation inhaler, , Disp: , Rfl:  
  amLODIPine-benazepril (LOTREL) 10-20 mg per capsule, Take 1 Cap by mouth daily. , Disp: 30 Cap, Rfl: 6 
  omeprazole (PRILOSEC) 20 mg capsule, TAKE ONE CAPSULE BY MOUTH ONCE DAILY, Disp: 90 Cap, Rfl: 2   carisoprodol (SOMA) 350 mg tablet, Take 1 Tab by mouth every eight (8) hours as needed for Muscle Spasm(s). Max Daily Amount: 1,050 mg., Disp: 90 Tab, Rfl: 0 
  magnesium oxide (MAG-OX) 400 mg tablet, TAKE ONE TABLET BY MOUTH ONCE DAILY, Disp: 30 Tab, Rfl: 0 
  promethazine (PHENERGAN) 25 mg tablet, Take 1 Tab by mouth every six (6) hours as needed for Nausea., Disp: 60 Tab, Rfl: 0 
  folic acid (FOLVITE) 1 mg tablet, Take 1 Tab by mouth daily. Indications: FOLATE DEFICIENCY, Disp: 90 Tab, Rfl: 3 
  B.infantis-B.ani-B.long-B.bifi (PROBIOTIC 4X) 10-15 mg TbEC, Take  by mouth., Disp: , Rfl:  
  glucose blood VI test strips (ONETOUCH ULTRA TEST) strip, Testing 2 times daily, Disp: 100 Strip, Rfl: 11 
  aspirin delayed-release 81 mg tablet, Take  by mouth daily. , Disp: , Rfl:  
  multivitamin (ONE A DAY) tablet, Take 1 Tab by mouth daily. , Disp: , Rfl:   
Date Last Reviewed:  11/14/2018 Number of Personal Factors/Comorbidities that affect the Plan of Care: 
(patient has a history of GERD, fibromyalgia, diverticulitis, and lupus) 1-2: MODERATE COMPLEXITY EXAMINATION:  
Observation/Orthostatic Postural Assessment:   
      Patient ambulates into dept independently with rounded shoulders and forward head on neck posture with flattened lumbar curve Palpation:   
      Tight B lumbar paraspinals and point tenderness in B gluteus medius -decreased tone in gastrocsoleus /quad/hamstring musculature ROM:   
    L;E range is wfl  
 
Trunk range of motion is wfl with 75% trunk flexion due to tight hamstrings and increased low back soreness Strength: Ankles are 4-/5 B quads and hamstrings are 3/5 -FALLS PRECAUTIONS B hip flexors are 4-/5 Special Tests:   
     Negative spring/compression/valsalva/stork/standing flexion and negative straight leg raise test 
 
 Neurological Screen: No radiating pain or tingling or numbness into LE's Balance:   
      Good in sitting Good- in standing Mental Status:   
      Alert and oriented but stressed at this time (her daughter needs 24/7 supervision due to seizure activity) Sensation:  
     Intact to light touch Body Structures Involved: 1. Bones 2. Joints 3. Muscles Body Functions Affected: 1. Sensory/Pain 2. Neuromusculoskeletal 
3. Movement Related Activities and Participation Affected: 1. Learning and Applying Knowledge 2. General Tasks and Demands 3. Mobility 4. Self Care 5. Domestic Life Number of elements (examined above) that affect the Plan of Care: 3: MODERATE COMPLEXITY CLINICAL PRESENTATION:  
Presentation: Evolving clinical presentation with changing clinical characteristics: MODERATE COMPLEXITY CLINICAL DECISION MAKING:  
Outcome Measure: Tool Used: Lower Extremity Functional Scale (LEFS) Score:  Initial: 24/80 Most Recent: X/80 (Date: -- ) Interpretation of Score: 20 questions each scored on a 5 point scale with 0 representing \"extreme difficulty or unable to perform\" and 4 representing \"no difficulty\". The lower the score, the greater the functional disability. 80/80 represents no disability. Minimal detectable change is 9 points. Score 80 79-63 62-48 47-32 31-16 15-1 0 Modifier CH CI CJ CK CL CM CN Medical Necessity:  
· Patient is expected to demonstrate progress in strength, range of motion, balance and coordination to increase independence with ADLs and improve safety during walking and transfers. · Skilled intervention continues to be required due to B LE weakness is affecting function and gait . Reason for Services/Other Comments: 
· Patient continues to require modification of therapeutic interventions to increase complexity of exercises.   
Use of outcome tool(s) and clinical judgement create a POC that gives a: 
 (outcome measure illustrates up to 79% impairment) Questionable prediction of patient's progress: MODERATE COMPLEXITY  
  
 
 
 
TREATMENT:  
(In addition to Assessment/Re-Assessment sessions the following treatments were rendered) Pre-treatment Symptoms/Complaints: Pt. Stated the cool wet weather has really aggravated her fibromyalgia in her neck and shoulders-able to work my legs through the fibro pain   
Pain: Initial:  
Pain Intensity 1: 8 Pain Location 1: Spine, cervical, Spine, thoracic Pain Orientation 1: Left, Right, Mid 
Pain Intervention(s) 1: Exercise  Post Session: 6-7/10 less tightness with muscle stimulation Therapeutic Exercise: ( ) x 45 minutes Exercises per grid below to improve mobility, strength, balance and coordination. Required minimal verbal cues to promote proper body alignment and promote proper body posture. Progressed resistance, range, repetitions and complexity of movement as indicated. Date: 
11-14-18 Date: 
11-7-18 Date: 
11/13/18 Activity/Exercise Parameters Parameters Parameters Gastrocnemius stretch 8 x 30 seconds on incline 2 x 30 second to each LE 4x30 sec hold Hamstring stretch  2 X 30 seconds with belt to each LE   4x30 sec hold strap Knee to chest  2 x 30 seconds to each LE  4x30 sec hold Piriformis stretch  2 x 30 seconds 4x30 sec hold Ankle pumps 2 x 20 2 x 20 ------  
Quad sets  2 x 10 with 3 second hold X 20 reps 5 sec hold Gluteal sets  X 10 with 3 second hold X 20 reps 5 sec hold Straight leg raises  X 10 to each LE with 2.5 lb ------ Hip adduction 2 x 10 with yellow theraball 2 x 10 with theraball X 20 reps x 10 sec hold Hip abduction X 20 with black band 2 x 10 with black band X 20 reps black band Long arc quads X 20 to each LE with 3.0 lb   2 x 10 to each LE with 2.5 lb 2x10 reps each LE with 2.5 lb wt. s Seated hamstring curls with eccentric knee extension X 20 to each LE with green  band 2 x 10 to each LE with red band X 20 reps red band 5 sec hold Marching in place 2 X 10 to each LE with 3.0 lb 2 x 10 to each LE with 2.5 lb 2x10 reps each 5 sec hold each Standing hamstring curls X 20 to each LE with 3.0 lb 2 x 10 to each LE with 2.5 lbs 2x10 reps each LE  With 2.5 lb wt.s Sit to stand 2 x 10 2 x 5 X 20 reps chair with blue airex Standing hip flexion X 20 to each LE with 3 lbs 2 x 10 to each LE with 2.5 lb X 20 reps 2.5 lb wt. s Standing hip abduction X 20 to each LE with 3 lbs 2 x 10 to each LE with 2.5 lb 2x10 reps BLE's 2,5 lb wt. s Standing hip extension X 20 to each LE with 3 lbs 2 x 10 to each LE with 2.5 lbs ----- Heel lifts 2 x 20 Nu step X 8 minutes at level 2 X 5 minutes at level 1  X 8 mins level 4 Manual Therapy (      ) Therapeutic Modalities: for pain and edema Cervical Spine Electrical Stimulation Type: Interferential 
Placement: B cervical and mid thoracics # of Electrodes: 4 Duration : 12 minutes Patient Position: Sitting HEP: As above; handouts given to patient for all exercises. Treatment/Session Assessment:   
· Response to Treatment:  Pt. Was compliant with all exercises and received relief to neck and shoulders with muscle stimulation-much less tightness in neck and shoulders following therapy ·   
· Compliance with Program/Exercises: compliant most of the time. Recommendations/Intent for next treatment session: \"Next visit will focus on advancements to more challenging activities\". progress LE/aerobic work -fibro relief activities as tolerated-push more extended posture Total Treatment Duration: 57 minutes Time in-1105 Time out-1202 Mar Stewart, PT

## 2018-11-20 ENCOUNTER — HOSPITAL ENCOUNTER (OUTPATIENT)
Dept: PHYSICAL THERAPY | Age: 48
End: 2018-11-20
Payer: COMMERCIAL

## 2018-11-26 ENCOUNTER — HOSPITAL ENCOUNTER (OUTPATIENT)
Dept: PHYSICAL THERAPY | Age: 48
Discharge: HOME | End: 2018-11-26
Payer: COMMERCIAL

## 2018-11-27 ENCOUNTER — HOSPITAL ENCOUNTER (OUTPATIENT)
Dept: PHYSICAL THERAPY | Age: 48
Discharge: HOME | End: 2018-11-27
Payer: COMMERCIAL

## 2018-12-26 NOTE — PROGRESS NOTES
April Pace  : 1970  Primary: [employer]*  Secondary:  [address] at [facility], Scott lopez, [address]  [phone]   [phone]       OUTPATIENT PHYSICAL [facility] 2018    ICD-10: Treatment Diagnosis: R53.1 generalized weakness and M79.7 fibromyalgia and R26.89 other abnormalities of gait and mobility   Precautions: weak quads-falls precautions  Allergies: Sulfa (sulfonamide antibiotics); Ciprofloxacin; Ibuprofen; Penicillins; and Prozac [fluoxetine]   Fall Risk Score: 1 (? 5 = High Risk)  MD Orders: evaluate and treat  MEDICAL/REFERRING DIAGNOSIS:  Weakness [R53.1]  [place]ET:18[place]  REFERRING PHYSICIAN: Kel Brandon,  [address] ASSESSMENT:  Ms. John Paul Mock is a 53 y.o. female presenting to physical therapy with complaints of generalized weakness but especially with weak legs and fear of falling-patient [place] and for the last 3 1/2 months she has been sick in her stomach with nausea and diarrhea-main complaint is stomach discomfort and fibromyalgia pain in her neck and scapular region-fibro pain is 8/10 at this time but her main concern is her weakness-history of low potassium with her diarrhea issues -unable to keep food down and patient has lost 32 lbs since mid July -LE range of motion is wfl -trunk range of motion is limited in flexion with tight hamstrings and low back soreness-very good candidate for skilled physical therapy to push LE exercises     PROBLEM LIST (Impacting functional limitations):  1. Decreased Strength  2. Decreased ADL/Functional Activities  3. Decreased Transfer Abilities  4. Decreased Ambulation Ability/Technique  5. Decreased Balance  6. Increased Pain  7. Decreased Activity Tolerance  8. Decreased Pacing Skills  9.  Decreased Flexibility/Joint Mobility INTERVENTIONS PLANNED:  1. Manual Therapy  2. Therapeutic Exercise/Strengthening   TREATMENT PLAN:  Effective Dates: 10/31/2018 TO 12/30/2018 (60 days). Frequency/Duration: 2 times a week for 60 Days  GOALS: (Goals have been discussed and agreed upon with patient.)  Short-Term Functional Goals: Time Frame: 11-14-18  1. Pain level is less than 8/10 in scapular region-GOAL MET  2. Quad and hamstring strength is improved to 3+/5 -PROGRESSING  Instruction in exercise program to allow increased ADLs. -GOAL MET  Discharge Goals: Time Frame:12-30-18   1. Neck and shoulder pain is 4/10 with consistent stretching to allow increased home ADLs -GOAL NOT MET  2. B quad and hamstring strength is 4-/5 to allow increased home chores and caring for her daughter who has seizures -GOAL NOT MET  3. Independent ambulation with no antalgic gait to allow walking community distances-GOAL NOT MET  4. Able to stand/walk for more than 1 hour -ONGOING  5. LEFS score is improved from 24/80 to 48/80-ONGOING    Patient was seen for 4 visits of rehab pushing LE strengthening from 10-31-18 to 11-14-18 with patient cancelling her last 3 scheduled appointments -mild pain improvement from 8/10 to 6/10 -mild increased LE strength -independent with home exercise program-most goals not met due to patient only coming for 4 treatments -DC to home program at this time -pleasant lady to work with       Rehabilitation Potential For Stated Goals: Good  Regarding Carolee Jenkins's therapy, I certify that the treatment plan above will be carried out by a therapist or under their direction. Thank you for this referral,  Brenna Crump PT     Referring Physician Signature: Laurel Amin MD              Date                    The information in this section was collected on 10-31-18 (except where otherwise noted).   HISTORY:   History of Present Injury/Illness (Reason for Referral):  3.5 months of sick to stomach with diarrhea and queasiness has left patient very weak -she has lost 32 lbs per patient -history of diverticulitis  Past Medical History/Comorbidities:   Ms. Julio Cesar Encinas  has a past medical history of Attention deficit disorder without mention of hyperactivity, Bronchitis, Chronic obstructive pulmonary disease ([facility] [address] Depressive disorder, not elsewhere classified, Diaphragmatic hernia without mention of obstruction or gangrene, Diverticulitis of colon (without mention of hemorrhage)(562.11), Endometriosis, Esophageal reflux, Fibromyalgia, Folate deficiency, Generalized hypermobility of joints, GERD (gastroesophageal reflux disease), Hypertension, Hypothyroidism, Irritable bowel syndrome, Leg pain, bilateral, Lupus, Migraine, unspecified, without mention of intractable migraine without mention of status migrainosus, Regional enteritis of large intestine ([facility] [address] Tobacco use disorder, and Vitamin D deficiency. Ms. Julio Cesar Encinas  has a past surgical history that includes hx lap cholecystectomy (01/04/2017); hx hysterectomy (4/2014); hx colonoscopy (12/2016); hx colonoscopy (01/23/2018); and hx endoscopy (01/23/2018). Social History/Living Environment:      Prior Level of Function/Work/Activity:  Independent with home ADLs and walking   Current Medications:       Current Outpatient Medications:     carisoprodol (SOMA) 350 mg tablet, Take 1 Tab by mouth every eight (8) hours as needed for Muscle Spasm(s). Max Daily Amount: 1,050 mg., Disp: 90 Tab, Rfl: 0    omeprazole (PRILOSEC) 20 mg capsule, Take 1 Cap by mouth daily. , Disp: 90 Cap, Rfl: 2    sertraline (ZOLOFT) 50 mg tablet, Take 1 Tab by mouth daily. , Disp: 30 Tab, Rfl: 3    Syringe with Needle, Disp, 3 mL 25 gauge x 1\" syrg, Use for B12 shot once a month, Disp: 15 Syringe, Rfl: 0    cyanocobalamin (VITAMIN B12) 1,000 mcg/mL injection, Inject as directed., Disp: 1 Vial, Rfl: 11    HYDROcodone-acetaminophen (NORCO)  mg tablet, Take 1 Tab by mouth every eight (8) hours as needed for Pain.  Max Daily Amount: 3 Tabs., Disp: 90 Tab, Rfl: 0    dextroamphetamine-amphetamine (ADDERALL) 30 mg tablet, Take 1 Tab by mouth two (2) times a dayEarliest Fill Date: 12/20/18. Max Daily Amount: 2 Tabs, Disp: 60 Tab, Rfl: 0    predniSONE (DELTASONE) 5 mg tablet, Take 1 Tab by mouth daily. , Disp: 30 Tab, Rfl: 0    sucralfate (CARAFATE) 1 gram tablet, Dissolve 1 tab in 10ml of water and take PO 4 times every day on an empty stomach 1 hour before meals and at bedtime, Disp: , Rfl:     hyoscyamine SL (LEVSIN/SL) 0.125 mg SL tablet, Take 1 Tab by mouth every six (6) hours as needed for Cramping., Disp: 120 Tab, Rfl: 0    SPIRIVA RESPIMAT 2.5 mcg/actuation inhaler, , Disp: , Rfl:     amLODIPine-benazepril (LOTREL) 10-20 mg per capsule, Take 1 Cap by mouth daily. , Disp: 30 Cap, Rfl: 6    magnesium oxide (MAG-OX) 400 mg tablet, TAKE ONE TABLET BY MOUTH ONCE DAILY, Disp: 30 Tab, Rfl: 0    promethazine (PHENERGAN) 25 mg tablet, Take 1 Tab by mouth every six (6) hours as needed for Nausea., Disp: 60 Tab, Rfl: 0    folic acid (FOLVITE) 1 mg tablet, Take 1 Tab by mouth daily. Indications: FOLATE DEFICIENCY, Disp: 90 Tab, Rfl: 3    B.infantis-B.ani-B.long-B.bifi (PROBIOTIC 4X) 10-15 mg TbEC, Take  by mouth., Disp: , Rfl:     glucose blood VI test strips (ONETOUCH ULTRA TEST) strip, Testing 2 times daily, Disp: 100 Strip, Rfl: 11    aspirin delayed-release 81 mg tablet, Take  by mouth daily. , Disp: , Rfl:     multivitamin (ONE A DAY) tablet, Take 1 Tab by mouth daily. , Disp: , Rfl:    Date Last Reviewed:  12/26/2018   Number of Personal Factors/Comorbidities that affect the Plan of Care:  (patient has a history of GERD, fibromyalgia, diverticulitis, and lupus) 1-2: MODERATE COMPLEXITY   EXAMINATION:   Observation/Orthostatic Postural Assessment:          Patient ambulates into dept independently with rounded shoulders and forward head on neck posture with flattened lumbar curve  Palpation:          Tight B lumbar paraspinals and point tenderness in B gluteus medius -decreased tone in gastrocsoleus /quad/hamstring musculature  ROM:        L;E range is wfl     Trunk range of motion is wfl with 75% trunk flexion due to tight hamstrings and increased low back soreness  Strength: Ankles are 4-/5    B quads and hamstrings are 3/5 -FALLS PRECAUTIONS     B hip flexors are 4-/5      Special Tests:         Negative spring/compression/valsalva/stork/standing flexion and negative straight leg raise test    Neurological Screen:    No radiating pain or tingling or numbness into LE's    Balance:          Good in sitting    Good- in standing     Mental Status:          Alert and oriented but stressed at this time (her daughter needs 24/7 supervision due to seizure activity)  Sensation:        Intact to light touch    Body Structures Involved:  1. Bones  2. Joints  3. Muscles Body Functions Affected:  1. Sensory/Pain  2. Neuromusculoskeletal  3. Movement Related Activities and Participation Affected:  1. Learning and Applying Knowledge  2. General Tasks and Demands  3. Mobility  4. Self Care  5. Domestic Life   Number of elements (examined above) that affect the Plan of Care: 3: MODERATE COMPLEXITY   CLINICAL PRESENTATION:   Presentation: Evolving clinical presentation with changing clinical characteristics: MODERATE COMPLEXITY   CLINICAL DECISION MAKING:   Outcome Measure: Tool Used: Lower Extremity Functional Scale (LEFS)  Score:  Initial: 24/80 Most Recent: X/80 (Date: -- )   Interpretation of Score: 20 questions each scored on a 5 point scale with 0 representing \"extreme difficulty or unable to perform\" and 4 representing \"no difficulty\". The lower the score, the greater the functional disability. 80/80 represents no disability. Minimal detectable change is 9 points.   Score 80 79-63 62-48 47-32 31-16 15-1 0   Modifier CH CI CJ CK CL CM CN         Medical Necessity:   · DC to home exercise program   Reason for Services/Other Comments:  · DC to home program   Use of outcome tool(s) and clinical judgement create a POC that gives a:  (outcome measure illustrates up to 79% impairment) Questionable prediction of patient's progress: MODERATE COMPLEXITY            TREATMENT:   (In addition to Assessment/Re-Assessment sessions the following treatments were rendered)  Pre-treatment Symptoms/Complaints: Pt. Stated the cool wet weather has really aggravated her fibromyalgia in her neck and shoulders-able to work my legs through the fibro pain    Pain: Initial:   Pain Intensity 1: 8  Pain Location 1: Spine, cervical, Spine, thoracic  Pain Orientation 1: Left, Right, Mid  Pain Intervention(s) 1: Exercise  Post Session: 6-7/10 less tightness with muscle stimulation-DC to home program     Therapeutic Exercise: ( )   Exercises per grid below to improve mobility, strength, balance and coordination. Required minimal verbal cues to promote proper body alignment and promote proper body posture. Progressed resistance, range, repetitions and complexity of movement as indicated. Date:  11-14-18 Date:  11-7-18 Date:  11/13/18   Activity/Exercise Parameters Parameters Parameters   Gastrocnemius stretch 8 x 30 seconds on incline 2 x 30 second to each LE 4x30 sec hold    Hamstring stretch  2 X 30 seconds with belt to each LE   4x30 sec hold strap    Knee to chest  2 x 30 seconds to each LE  4x30 sec hold    Piriformis stretch  2 x 30 seconds 4x30 sec hold    Ankle pumps 2 x 20 2 x 20 ------   Quad sets  2 x 10 with 3 second hold X 20 reps 5 sec hold    Gluteal sets  X 10 with 3 second hold X 20 reps 5 sec hold    Straight leg raises  X 10 to each LE with 2.5 lb ------   Hip adduction 2 x 10 with yellow theraball 2 x 10 with theraball X 20 reps x 10 sec hold    Hip abduction X 20 with black band 2 x 10 with black band X 20 reps black band    Long arc quads X 20 to each LE with 3.0 lb   2 x 10 to each LE with 2.5 lb 2x10 reps each LE with 2.5 lb wt. s    Seated hamstring curls with eccentric knee extension X 20 to each LE with green  band 2 x 10 to each LE with red band X 20 reps red band 5 sec hold    Marching in place 2 X 10 to each LE with 3.0 lb 2 x 10 to each LE with 2.5 lb 2x10 reps each 5 sec hold each    Standing hamstring curls X 20 to each LE with 3.0 lb 2 x 10 to each LE with 2.5 lbs 2x10 reps each LE  With 2.5 lb wt.s    Sit to stand 2 x 10 2 x 5 X 20 reps chair with blue airex    Standing hip flexion X 20 to each LE with 3 lbs 2 x 10 to each LE with 2.5 lb X 20 reps 2.5 lb wt. s    Standing hip abduction X 20 to each LE with 3 lbs 2 x 10 to each LE with 2.5 lb 2x10 reps BLE's 2,5 lb wt. s    Standing hip extension X 20 to each LE with 3 lbs 2 x 10 to each LE with 2.5 lbs -----   Heel lifts 2 x 20     Nu step X 8 minutes at level 2 X 5 minutes at level 1  X 8 mins level 4      Manual Therapy (      )        Therapeutic Modalities: for pain and edema                                                                                              HEP: As above; handouts given to patient for all exercises. Treatment/Session Assessment:    · Response to Treatment:  Pt. Was compliant with all exercises and received relief to neck and shoulders with muscle stimulation-much less tightness in neck and shoulders following therapy -DC to home program at this time  ·    · Compliance with Program/Exercises: compliant most of the time.   Recommendations/Intent for next treatment session: DC to home program after 4 visits of therapy pushing LE strengthening -patient is independent with home program     Art Logan, PT

## 2019-01-24 ENCOUNTER — HOSPITAL ENCOUNTER (OUTPATIENT)
Dept: MRI IMAGING | Age: 49
Discharge: HOME | End: 2019-01-24
Attending: FAMILY MEDICINE
Payer: COMMERCIAL

## 2019-01-24 DIAGNOSIS — R51.9 CHRONIC NONINTRACTABLE HEADACHE, UNSPECIFIED HEADACHE TYPE: ICD-10-CM

## 2019-01-24 DIAGNOSIS — R29.898 WEAKNESS OF BOTH LEGS: ICD-10-CM

## 2019-01-24 DIAGNOSIS — M79.605 LEG PAIN, BILATERAL: Chronic | ICD-10-CM

## 2019-01-24 DIAGNOSIS — G89.29 CHRONIC NONINTRACTABLE HEADACHE, UNSPECIFIED HEADACHE TYPE: ICD-10-CM

## 2019-01-24 DIAGNOSIS — M62.569 ATROPHY OF MUSCLE OF LOWER LEG, UNSPECIFIED LATERALITY: ICD-10-CM

## 2019-01-24 DIAGNOSIS — M79.604 LEG PAIN, BILATERAL: Chronic | ICD-10-CM

## 2019-01-24 PROCEDURE — 70553 MRI BRAIN STEM W/O & W/DYE: CPT

## 2019-01-24 PROCEDURE — A9575 INJ GADOTERATE MEGLUMI 0.1ML: HCPCS | Performed by: FAMILY MEDICINE

## 2019-01-24 PROCEDURE — 74011250636 HC RX REV CODE- 250/636: Performed by: FAMILY MEDICINE

## 2019-01-24 PROCEDURE — 72141 MRI NECK SPINE W/O DYE: CPT

## 2019-01-24 RX ORDER — SODIUM CHLORIDE 0.9 % (FLUSH) 0.9 %
10 SYRINGE (ML) INJECTION
Status: COMPLETED | OUTPATIENT
Start: 2019-01-24 | End: 2019-01-24

## 2019-01-24 RX ORDER — GADOTERATE MEGLUMINE 376.9 MG/ML
13 INJECTION INTRAVENOUS
Status: COMPLETED | OUTPATIENT
Start: 2019-01-24 | End: 2019-01-24

## 2019-01-24 RX ADMIN — GADOTERATE MEGLUMINE 13 ML: 376.9 INJECTION INTRAVENOUS at 17:44

## 2019-01-24 RX ADMIN — Medication 10 ML: at 17:44

## 2019-02-27 PROBLEM — R20.2 NUMBNESS AND TINGLING: Status: ACTIVE | Noted: 2019-02-27

## 2019-02-27 PROBLEM — R20.0 NUMBNESS AND TINGLING: Status: ACTIVE | Noted: 2019-02-27

## 2019-02-27 PROBLEM — G62.9 NEUROPATHY: Status: ACTIVE | Noted: 2019-02-27

## 2021-03-16 ENCOUNTER — APPOINTMENT (OUTPATIENT)
Dept: CT IMAGING | Age: 51
End: 2021-03-16
Attending: EMERGENCY MEDICINE
Payer: COMMERCIAL

## 2021-03-16 ENCOUNTER — HOSPITAL ENCOUNTER (EMERGENCY)
Age: 51
Discharge: HOME | End: 2021-03-16
Attending: EMERGENCY MEDICINE
Payer: COMMERCIAL

## 2021-03-16 ENCOUNTER — APPOINTMENT (OUTPATIENT)
Dept: GENERAL RADIOLOGY | Age: 51
End: 2021-03-16
Attending: EMERGENCY MEDICINE
Payer: COMMERCIAL

## 2021-03-16 VITALS
BODY MASS INDEX: 28.51 KG/M2 | WEIGHT: 167 LBS | HEART RATE: 92 BPM | HEIGHT: 64 IN | OXYGEN SATURATION: 95 % | RESPIRATION RATE: 18 BRPM | SYSTOLIC BLOOD PRESSURE: 120 MMHG | TEMPERATURE: 98.4 F | DIASTOLIC BLOOD PRESSURE: 85 MMHG

## 2021-03-16 DIAGNOSIS — R06.02 SOB (SHORTNESS OF BREATH): Primary | ICD-10-CM

## 2021-03-16 LAB
ALBUMIN SERPL-MCNC: 3.4 G/DL (ref 3.5–5)
ALBUMIN/GLOB SERPL: 1 {RATIO} (ref 1.2–3.5)
ALP SERPL-CCNC: 87 U/L (ref 50–136)
ALT SERPL-CCNC: 25 U/L (ref 12–65)
ANION GAP SERPL CALC-SCNC: 4 MMOL/L (ref 7–16)
AST SERPL-CCNC: 20 U/L (ref 15–37)
ATRIAL RATE: 104 BPM
BASOPHILS # BLD: 0 K/UL (ref 0–0.2)
BASOPHILS NFR BLD: 1 % (ref 0–2)
BILIRUB SERPL-MCNC: 0.5 MG/DL (ref 0.2–1.1)
BUN SERPL-MCNC: 11 MG/DL (ref 6–23)
CALCIUM SERPL-MCNC: 9.1 MG/DL (ref 8.3–10.4)
CALCULATED P AXIS, ECG09: 58 DEGREES
CALCULATED R AXIS, ECG10: 42 DEGREES
CALCULATED T AXIS, ECG11: 50 DEGREES
CHLORIDE SERPL-SCNC: 104 MMOL/L (ref 98–107)
CO2 SERPL-SCNC: 32 MMOL/L (ref 21–32)
CREAT SERPL-MCNC: 0.84 MG/DL (ref 0.6–1)
D DIMER PPP FEU-MCNC: 0.42 UG/ML(FEU)
DIAGNOSIS, 93000: NORMAL
DIFFERENTIAL METHOD BLD: ABNORMAL
EOSINOPHIL # BLD: 0.3 K/UL (ref 0–0.8)
EOSINOPHIL NFR BLD: 4 % (ref 0.5–7.8)
ERYTHROCYTE [DISTWIDTH] IN BLOOD BY AUTOMATED COUNT: 13.8 % (ref 11.9–14.6)
GLOBULIN SER CALC-MCNC: 3.3 G/DL (ref 2.3–3.5)
GLUCOSE SERPL-MCNC: 111 MG/DL (ref 65–100)
HCT VFR BLD AUTO: 42.4 % (ref 35.8–46.3)
HGB BLD-MCNC: 14.2 G/DL (ref 11.7–15.4)
IMM GRANULOCYTES # BLD AUTO: 0 K/UL (ref 0–0.5)
IMM GRANULOCYTES NFR BLD AUTO: 0 % (ref 0–5)
LYMPHOCYTES # BLD: 2.5 K/UL (ref 0.5–4.6)
LYMPHOCYTES NFR BLD: 34 % (ref 13–44)
MCH RBC QN AUTO: 31.2 PG (ref 26.1–32.9)
MCHC RBC AUTO-ENTMCNC: 33.5 G/DL (ref 31.4–35)
MCV RBC AUTO: 93.2 FL (ref 79.6–97.8)
MONOCYTES # BLD: 0.3 K/UL (ref 0.1–1.3)
MONOCYTES NFR BLD: 4 % (ref 4–12)
NEUTS SEG # BLD: 4.1 K/UL (ref 1.7–8.2)
NEUTS SEG NFR BLD: 57 % (ref 43–78)
NRBC # BLD: 0 K/UL (ref 0–0.2)
P-R INTERVAL, ECG05: 174 MS
PLATELET # BLD AUTO: 325 K/UL (ref 150–450)
PMV BLD AUTO: 8.9 FL (ref 9.4–12.3)
POTASSIUM SERPL-SCNC: 3.3 MMOL/L (ref 3.5–5.1)
PROT SERPL-MCNC: 6.7 G/DL (ref 6.3–8.2)
Q-T INTERVAL, ECG07: 364 MS
QRS DURATION, ECG06: 74 MS
QTC CALCULATION (BEZET), ECG08: 478 MS
RBC # BLD AUTO: 4.55 M/UL (ref 4.05–5.2)
SODIUM SERPL-SCNC: 140 MMOL/L (ref 136–145)
TROPONIN-HIGH SENSITIVITY: 5 PG/ML (ref 0–14)
TROPONIN-HIGH SENSITIVITY: 5.8 PG/ML (ref 0–14)
VENTRICULAR RATE, ECG03: 104 BPM
WBC # BLD AUTO: 7.2 K/UL (ref 4.3–11.1)

## 2021-03-16 PROCEDURE — 74011000636 HC RX REV CODE- 636: Performed by: EMERGENCY MEDICINE

## 2021-03-16 PROCEDURE — 71045 X-RAY EXAM CHEST 1 VIEW: CPT

## 2021-03-16 PROCEDURE — 80053 COMPREHEN METABOLIC PANEL: CPT

## 2021-03-16 PROCEDURE — 74011250636 HC RX REV CODE- 250/636: Performed by: EMERGENCY MEDICINE

## 2021-03-16 PROCEDURE — 96361 HYDRATE IV INFUSION ADD-ON: CPT

## 2021-03-16 PROCEDURE — 93005 ELECTROCARDIOGRAM TRACING: CPT | Performed by: EMERGENCY MEDICINE

## 2021-03-16 PROCEDURE — 96360 HYDRATION IV INFUSION INIT: CPT

## 2021-03-16 PROCEDURE — 74011000258 HC RX REV CODE- 258: Performed by: EMERGENCY MEDICINE

## 2021-03-16 PROCEDURE — 71260 CT THORAX DX C+: CPT

## 2021-03-16 PROCEDURE — 85025 COMPLETE CBC W/AUTO DIFF WBC: CPT

## 2021-03-16 PROCEDURE — 85379 FIBRIN DEGRADATION QUANT: CPT

## 2021-03-16 PROCEDURE — 99284 EMERGENCY DEPT VISIT MOD MDM: CPT

## 2021-03-16 PROCEDURE — 84484 ASSAY OF TROPONIN QUANT: CPT

## 2021-03-16 RX ORDER — SODIUM CHLORIDE 0.9 % (FLUSH) 0.9 %
10 SYRINGE (ML) INJECTION
Status: COMPLETED | OUTPATIENT
Start: 2021-03-16 | End: 2021-03-16

## 2021-03-16 RX ADMIN — Medication 10 ML: at 19:49

## 2021-03-16 RX ADMIN — IOPAMIDOL 100 ML: 755 INJECTION, SOLUTION INTRAVENOUS at 19:49

## 2021-03-16 RX ADMIN — SODIUM CHLORIDE 500 ML: 900 INJECTION, SOLUTION INTRAVENOUS at 20:49

## 2021-03-16 RX ADMIN — SODIUM CHLORIDE 100 ML: 900 INJECTION, SOLUTION INTRAVENOUS at 19:49

## 2021-03-16 NOTE — ED PROVIDER NOTES
Patient with chronic pain and COPD. Went to [place] to get some medications adjusted. Ended up being admitted to the hospital and diagnosed with a pulmonary embolism. Started on Xarelto and discharged Friday. Drove back here over the weekend and got in this morning. Has not had any of her Xarelto filled or taking it over the weekend. Has felt more short of breath with questionable irregular heartbeat but no chest pain. No cough or congestion. Recently changed her insurance and is unable to follow-up with her pulmonologist with [facility]. Here for evaluation. The history is provided by the patient. No  was used. Shortness of Breath  This is a new problem. The problem occurs continuously. The current episode started more than 2 days ago. The problem has been gradually worsening. Pertinent negatives include no fever, no headaches, no rhinorrhea, no sore throat, no neck pain, no cough, no sputum production, no wheezing, no orthopnea, no chest pain, no vomiting, no abdominal pain, no rash, no leg pain and no leg swelling. She has tried nothing for the symptoms. She has had prior hospitalizations. Associated medical issues include COPD and PE.         Past Medical History:   Diagnosis Date    Attention deficit disorder without mention of hyperactivity     Bronchitis     Chronic obstructive pulmonary disease (HCC)     Depressive disorder, not elsewhere classified     Diaphragmatic hernia without mention of obstruction or gangrene     Diverticulitis of colon (without mention of hemorrhage)(562.11)     Endometriosis     Esophageal reflux     Fibromyalgia     Folate deficiency     Generalized hypermobility of joints     GERD (gastroesophageal reflux disease)     Hypertension     Hypothyroidism     Irritable bowel syndrome     Leg pain, bilateral 12/9/2015    Lupus ([facility] [address]     Migraine, unspecified, without mention of intractable migraine without mention of status migrainosus     [facility]     Tobacco use disorder     Vitamin D deficiency        Past Surgical History:   Procedure Laterality Date    HX COLONOSCOPY  12/2016    HX COLONOSCOPY  01/23/2018    diverticulosis, int and ext hemorrhoids    HX ENDOSCOPY  01/23/2018    polyp in duodenum, chronic gastritis with hemorrhage    HX HYSTERECTOMY  4/2014    HX LAP CHOLECYSTECTOMY  01/04/2017         Family History:   Problem Relation Age of Onset    Heart Attack Father     Diabetes Paternal Grandfather     Hypertension Mother     Diabetes Mother     Thyroid Disease Mother     Psychiatric Disorder Mother         anxiety    Lung Disease Other         aunt with \"rare type of lung disorder, required Oxygen\"    Seizures Child        Social History     Socioeconomic History    Marital status:      Spouse name: Not on file    Number of children: Not on file    Years of education: Not on file    Highest education level: Not on file   Occupational History    Not on file   Social Needs    Financial resource strain: Not on file    Food insecurity     Worry: Not on file     Inability: Not on file    Transportation needs     Medical: Not on file     Non-medical: Not on file   Tobacco Use    Smoking status: Current Every Day Smoker     Packs/day: 0.50     Years: 30.00     Pack years: 15.00    Smokeless tobacco: Never Used    Tobacco comment: She said that she does not smoke all the time.  [facility] 9/19/19   Substance and Sexual Activity    Alcohol use: No     Alcohol/week: 0.0 standard drinks     Comment: quit 2 years ago    Drug use: Not Currently    Sexual activity: Yes     Partners: Male     Birth control/protection: Surgical   Lifestyle    Physical activity     Days per week: Not on file     Minutes per session: Not on file    Stress: Not on file   Relationships    Social connections     Talks on phone: Not on file     Gets together: Not on file     Attends [employer] service: Not on file Active member of club or organization: Not on file     Attends meetings of clubs or organizations: Not on file     Relationship status: Not on file    Intimate partner violence     Fear of current or ex partner: Not on file     Emotionally abused: Not on file     Physically abused: Not on file     Forced sexual activity: Not on file   Other Topics Concern    Not on file   Social History Narrative    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 She is . She has always lived in [place]. No history of toxic industrial, environmental, asbestos or TB exposure. ALLERGIES: Sulfa (sulfonamide antibiotics), Bactrim [sulfamethoprim], Ciprofloxacin, Ibuprofen, Penicillins, and Prozac [fluoxetine]    Review of Systems   Constitutional: Negative for chills and fever. HENT: Negative for rhinorrhea and sore throat. Eyes: Negative for pain and redness. Respiratory: Positive for shortness of breath. Negative for cough, sputum production, chest tightness and wheezing. Cardiovascular: Positive for palpitations. Negative for chest pain, orthopnea and leg swelling. Gastrointestinal: Negative for abdominal pain, diarrhea, nausea and vomiting. Genitourinary: Negative for dysuria and hematuria. Musculoskeletal: Negative for back pain, gait problem, neck pain and neck stiffness. Skin: Negative for color change and rash. Neurological: Negative for weakness, numbness and headaches. Vitals:    03/16/21 1823   BP: (!) 135/93   Pulse: 100   Resp: 18   Temp: 98.4 °F (36.9 °C)   SpO2: 97%   Weight: 75.8 kg (167 lb)   Height: 5' 4\" (1.626 m)            Physical Exam  Constitutional:       Appearance: Normal appearance. She is well-developed. HENT:      Head: Normocephalic and atraumatic. Neck:      Musculoskeletal: Normal range of motion and neck supple. Cardiovascular:      Rate and Rhythm: Normal rate. Rhythm irregular.       Comments: Occasional dropped beats. Pulmonary:      Effort: Pulmonary effort is normal. No respiratory distress. Breath sounds: Normal breath sounds. No wheezing. Abdominal:      General: Bowel sounds are normal.      Palpations: Abdomen is soft. Tenderness: There is no abdominal tenderness. Musculoskeletal: Normal range of motion. General: No swelling. Skin:     General: Skin is warm and dry. Neurological:      Mental Status: She is alert and oriented to person, place, and time. MDM  Number of Diagnoses or Management Options  Diagnosis management comments: Patient recently diagnosed with a small right-sided pulmonary embolism on CT from [place]. Unable to view those images. CT here does not show any PE. Discussed with radiology with this new knowledge and once again confirms no PE. Patient is not hypoxic. Patient had lower extremity duplex ultrasounds ruling out DVT in [place]. Has not had any blood thinners over the weekend to today. Discussed with pulmonary who recommends getting a D-dimer. If this is negative then very unlikely patient has a PE of any significance and does not need any blood thinners. If positive will restart patient's Xarelto. With D-dimer negative will discharge with no blood thinner and follow-up with pulmonary. Amount and/or Complexity of Data Reviewed  Clinical lab tests: ordered and reviewed  Tests in the radiology section of CPT®: ordered and reviewed  Tests in the medicine section of CPT®: ordered and reviewed    Patient Progress  Patient progress: stable         Procedures        EKG: nonspecific ST and T waves changes, sinus tachycardia. Rate 104.         CT CHEST W CONT (Edited Result - FINAL)  Result time 03/16/21 21:05:38  Addendum 1 of 1 by Patsy Blair MD (03/16/21 21:05:38)    Addendum:      This case was discussed with emergency department physician caring for the  patient who received a report from a recent out-[place]-state CT describing a small  right-sided pulmonary embolism. A second review of the right lung does not  confirm an occlusive embolus on this scan.                  XR CHEST PORT (Final result)  Result time 03/16/21 18:49:57  Final result by Keny Brower Jr., MD (03/16/21 18:49:57)                Impression:    No consolidation.            Narrative:    AP PORTABLE CHEST X-RAY 3/16/2021 6:46 PM     HISTORY: shob  HX----ER PT W/ acute moderate and increasing shortness of breath,   recent DX of PE last week     COMPARISON: None     FINDINGS: There is no lobar consolidation, pleural effusions or pulmonary edema.                   Results Include:    Recent Results (from the past 24 hour(s))   EKG, 12 LEAD, INITIAL    Collection Time: 03/16/21  6:24 PM   Result Value Ref Range    Ventricular Rate 104 BPM    Atrial Rate 104 BPM    P-R Interval 174 ms    QRS Duration 74 ms    Q-T Interval 364 ms    QTC Calculation (Bezet) 478 ms    Calculated P Axis 58 degrees    Calculated R Axis 42 degrees    Calculated T Axis 50 degrees    Diagnosis       Sinus tachycardia with occasional Premature ventricular complexes  Low voltage QRS  Cannot rule out Anterior infarct , age undetermined  Abnormal ECG  No previous ECGs available     CBC WITH AUTOMATED DIFF    Collection Time: 03/16/21  6:27 PM   Result Value Ref Range    WBC 7.2 4.3 - 11.1 K/uL    RBC 4.55 4.05 - 5.2 M/uL    HGB 14.2 11.7 - 15.4 g/dL    HCT 42.4 35.8 - 46.3 %    MCV 93.2 79.6 - 97.8 FL    MCH 31.2 26.1 - 32.9 PG    MCHC 33.5 31.4 - 35.0 g/dL    RDW 13.8 11.9 - 14.6 %    PLATELET 325 150 - 450 K/uL    MPV 8.9 (L) 9.4 - 12.3 FL    ABSOLUTE NRBC 0.00 0.0 - 0.2 K/uL    DF AUTOMATED      NEUTROPHILS 57 43 - 78 %    LYMPHOCYTES 34 13 - 44 %    MONOCYTES 4 4.0 - 12.0 %    EOSINOPHILS 4 0.5 - 7.8 %    BASOPHILS 1 0.0 - 2.0 %    IMMATURE GRANULOCYTES 0 0.0 - 5.0 %    ABS. NEUTROPHILS 4.1 1.7 - 8.2 K/UL    ABS. LYMPHOCYTES 2.5 0.5 - 4.6 K/UL    ABS. MONOCYTES 0.3 0.1 - 1.3 K/UL    ABS.  EOSINOPHILS 0.3 0.0 - 0.8 K/UL    ABS. BASOPHILS 0.0 0.0 - 0.2 K/UL    ABS. IMM. GRANS. 0.0 0.0 - 0.5 K/UL   METABOLIC PANEL, COMPREHENSIVE    Collection Time: 03/16/21  6:27 PM   Result Value Ref Range    Sodium 140 136 - 145 mmol/L    Potassium 3.3 (L) 3.5 - 5.1 mmol/L    Chloride 104 98 - 107 mmol/L    CO2 32 21 - 32 mmol/L    Anion gap 4 (L) 7 - 16 mmol/L    Glucose 111 (H) 65 - 100 mg/dL    BUN 11 6 - 23 MG/DL    Creatinine 0.84 0.6 - 1.0 MG/DL    GFR est AA >60 >60 ml/min/1.73m2    GFR est non-AA >60 >60 ml/min/1.73m2    Calcium 9.1 8.3 - 10.4 MG/DL    Bilirubin, total 0.5 0.2 - 1.1 MG/DL    ALT (SGPT) 25 12 - 65 U/L    AST (SGOT) 20 15 - 37 U/L    Alk.  phosphatase 87 50 - 136 U/L    Protein, total 6.7 6.3 - 8.2 g/dL    Albumin 3.4 (L) 3.5 - 5.0 g/dL    Globulin 3.3 2.3 - 3.5 g/dL    A-G Ratio 1.0 (L) 1.2 - 3.5     TROPONIN-HIGH SENSITIVITY    Collection Time: 03/16/21  6:27 PM   Result Value Ref Range    Troponin-High Sensitivity 5.0 0 - 14 pg/mL

## 2021-03-16 NOTE — ED TRIAGE NOTES
Pt arrived via POV c/o shob that has been increasing over the past few days. Pt reports that she was in the hospital in [place] and d/c with PE in her lungs. Pt was given a blood thinner in the hospital but has not filled her script here yet. Reports chest pressure, cough and fatigue. Denies NVD.

## 2021-03-17 NOTE — ED NOTES
I have reviewed discharge instructions with the patient. The patient verbalized understanding. Patient left ED via Discharge Method: ambulatory to Home with . Opportunity for questions and clarification provided. Patient given 0 scripts. To continue your aftercare when you leave the hospital, you may receive an automated call from our care team to check in on how you are doing. This is a free service and part of our promise to provide the best care and service to meet your aftercare needs.  If you have questions, or wish to unsubscribe from this service please call [phone]. Thank you for Choosing our [facility] Emergency Department.

## 2021-05-06 ENCOUNTER — HOSPITAL ENCOUNTER (OUTPATIENT)
Dept: SLEEP MEDICINE | Age: 51
Discharge: HOME | End: 2021-05-06
Payer: COMMERCIAL

## 2021-05-06 PROCEDURE — 95810 POLYSOM 6/> YRS 4/> PARAM: CPT

## 2021-07-22 ENCOUNTER — HOSPITAL ENCOUNTER (OUTPATIENT)
Dept: GENERAL RADIOLOGY | Age: 51
Discharge: HOME | End: 2021-07-22
Attending: INTERNAL MEDICINE
Payer: COMMERCIAL

## 2021-07-22 ENCOUNTER — HOSPITAL ENCOUNTER (OUTPATIENT)
Dept: NUCLEAR MEDICINE | Age: 51
Discharge: HOME | End: 2021-07-22
Attending: INTERNAL MEDICINE
Payer: COMMERCIAL

## 2021-07-22 DIAGNOSIS — R94.2 DECREASED DIFFUSION CAPACITY: ICD-10-CM

## 2021-07-22 DIAGNOSIS — J43.2 CENTRILOBULAR EMPHYSEMA (HCC): ICD-10-CM

## 2021-07-22 DIAGNOSIS — R06.02 SHORTNESS OF BREATH: ICD-10-CM

## 2021-07-22 PROCEDURE — A9540 TC99M MAA: HCPCS

## 2021-07-22 PROCEDURE — 71046 X-RAY EXAM CHEST 2 VIEWS: CPT

## 2021-08-24 ENCOUNTER — APPOINTMENT (OUTPATIENT)
Dept: GENERAL RADIOLOGY | Age: 51
End: 2021-08-24
Attending: EMERGENCY MEDICINE
Payer: COMMERCIAL

## 2021-08-24 ENCOUNTER — HOSPITAL ENCOUNTER (EMERGENCY)
Age: 51
Discharge: HOME | End: 2021-08-24
Attending: EMERGENCY MEDICINE
Payer: COMMERCIAL

## 2021-08-24 VITALS
BODY MASS INDEX: 27.64 KG/M2 | SYSTOLIC BLOOD PRESSURE: 147 MMHG | DIASTOLIC BLOOD PRESSURE: 88 MMHG | HEART RATE: 102 BPM | WEIGHT: 156 LBS | RESPIRATION RATE: 18 BRPM | OXYGEN SATURATION: 100 % | TEMPERATURE: 98.5 F | HEIGHT: 63 IN

## 2021-08-24 DIAGNOSIS — B34.9 VIRAL SYNDROME: Primary | ICD-10-CM

## 2021-08-24 DIAGNOSIS — J44.1 COPD EXACERBATION (HCC): ICD-10-CM

## 2021-08-24 DIAGNOSIS — J43.2 CENTRILOBULAR EMPHYSEMA (HCC): ICD-10-CM

## 2021-08-24 LAB
ALBUMIN SERPL-MCNC: 4 G/DL (ref 3.5–5)
ALBUMIN/GLOB SERPL: 1.1 {RATIO} (ref 1.2–3.5)
ALP SERPL-CCNC: 123 U/L (ref 50–136)
ALT SERPL-CCNC: 20 U/L (ref 12–65)
ANION GAP SERPL CALC-SCNC: 2 MMOL/L (ref 7–16)
AST SERPL-CCNC: 9 U/L (ref 15–37)
BASOPHILS # BLD: 0 K/UL (ref 0–0.2)
BASOPHILS NFR BLD: 0 % (ref 0–2)
BILIRUB SERPL-MCNC: 0.7 MG/DL (ref 0.2–1.1)
BUN SERPL-MCNC: 8 MG/DL (ref 6–23)
CALCIUM SERPL-MCNC: 9.4 MG/DL (ref 8.3–10.4)
CHLORIDE SERPL-SCNC: 107 MMOL/L (ref 98–107)
CO2 SERPL-SCNC: 33 MMOL/L (ref 21–32)
COVID-19 RAPID TEST, COVR: NOT DETECTED
CREAT SERPL-MCNC: 0.78 MG/DL (ref 0.6–1)
DIFFERENTIAL METHOD BLD: ABNORMAL
EOSINOPHIL # BLD: 0.5 K/UL (ref 0–0.8)
EOSINOPHIL NFR BLD: 5 % (ref 0.5–7.8)
ERYTHROCYTE [DISTWIDTH] IN BLOOD BY AUTOMATED COUNT: 13.6 % (ref 11.9–14.6)
GLOBULIN SER CALC-MCNC: 3.8 G/DL (ref 2.3–3.5)
GLUCOSE SERPL-MCNC: 95 MG/DL (ref 65–100)
HCT VFR BLD AUTO: 45.8 % (ref 35.8–46.3)
HGB BLD-MCNC: 15.4 G/DL (ref 11.7–15.4)
IMM GRANULOCYTES # BLD AUTO: 0 K/UL (ref 0–0.5)
IMM GRANULOCYTES NFR BLD AUTO: 0 % (ref 0–5)
LIPASE SERPL-CCNC: 193 U/L (ref 73–393)
LYMPHOCYTES # BLD: 2.1 K/UL (ref 0.5–4.6)
LYMPHOCYTES NFR BLD: 20 % (ref 13–44)
MAGNESIUM SERPL-MCNC: 2.5 MG/DL (ref 1.8–2.4)
MCH RBC QN AUTO: 30.6 PG (ref 26.1–32.9)
MCHC RBC AUTO-ENTMCNC: 33.6 G/DL (ref 31.4–35)
MCV RBC AUTO: 91.1 FL (ref 79.6–97.8)
MONOCYTES # BLD: 0.3 K/UL (ref 0.1–1.3)
MONOCYTES NFR BLD: 3 % (ref 4–12)
NEUTS SEG # BLD: 7.5 K/UL (ref 1.7–8.2)
NEUTS SEG NFR BLD: 72 % (ref 43–78)
NRBC # BLD: 0 K/UL (ref 0–0.2)
PLATELET # BLD AUTO: 302 K/UL (ref 150–450)
PMV BLD AUTO: 9 FL (ref 9.4–12.3)
POTASSIUM SERPL-SCNC: 3.1 MMOL/L (ref 3.5–5.1)
PROT SERPL-MCNC: 7.8 G/DL (ref 6.3–8.2)
RBC # BLD AUTO: 5.03 M/UL (ref 4.05–5.2)
SARS-COV-2, COV2: NORMAL
SODIUM SERPL-SCNC: 142 MMOL/L (ref 136–145)
SOURCE, COVRS: NORMAL
WBC # BLD AUTO: 10.4 K/UL (ref 4.3–11.1)

## 2021-08-24 PROCEDURE — 87635 SARS-COV-2 COVID-19 AMP PRB: CPT

## 2021-08-24 PROCEDURE — 80053 COMPREHEN METABOLIC PANEL: CPT

## 2021-08-24 PROCEDURE — 83735 ASSAY OF MAGNESIUM: CPT

## 2021-08-24 PROCEDURE — 83690 ASSAY OF LIPASE: CPT

## 2021-08-24 PROCEDURE — 71046 X-RAY EXAM CHEST 2 VIEWS: CPT

## 2021-08-24 PROCEDURE — 99283 EMERGENCY DEPT VISIT LOW MDM: CPT

## 2021-08-24 PROCEDURE — 85025 COMPLETE CBC W/AUTO DIFF WBC: CPT

## 2021-08-24 RX ORDER — ALBUTEROL SULFATE 90 UG/1
2 AEROSOL, METERED RESPIRATORY (INHALATION)
Qty: 1 INHALER | Refills: 0 | Status: SHIPPED | OUTPATIENT
Start: 2021-08-24 | End: 2021-10-15

## 2021-08-24 RX ORDER — SODIUM CHLORIDE 0.9 % (FLUSH) 0.9 %
5-10 SYRINGE (ML) INJECTION EVERY 8 HOURS
Status: DISCONTINUED | OUTPATIENT
Start: 2021-08-24 | End: 2021-08-25 | Stop reason: HOSPADM

## 2021-08-24 RX ORDER — ONDANSETRON 8 MG/1
8 TABLET, ORALLY DISINTEGRATING ORAL
Qty: 12 TABLET | Refills: 1 | Status: SHIPPED | OUTPATIENT
Start: 2021-08-24

## 2021-08-24 RX ORDER — BENZONATATE 200 MG/1
200 CAPSULE ORAL
Qty: 21 CAPSULE | Refills: 0 | Status: SHIPPED | OUTPATIENT
Start: 2021-08-24 | End: 2021-08-31

## 2021-08-24 RX ORDER — SODIUM CHLORIDE 0.9 % (FLUSH) 0.9 %
5-10 SYRINGE (ML) INJECTION AS NEEDED
Status: DISCONTINUED | OUTPATIENT
Start: 2021-08-24 | End: 2021-08-25 | Stop reason: HOSPADM

## 2021-08-24 RX ORDER — DIPHENOXYLATE HYDROCHLORIDE AND ATROPINE SULFATE 2.5; .025 MG/1; MG/1
2 TABLET ORAL
Qty: 20 TABLET | Refills: 0 | Status: SHIPPED | OUTPATIENT
Start: 2021-08-24

## 2021-08-24 RX ORDER — PROMETHAZINE HYDROCHLORIDE 25 MG/1
25 TABLET ORAL
Qty: 12 TABLET | Refills: 0 | Status: SHIPPED | OUTPATIENT
Start: 2021-08-24 | End: 2021-10-15

## 2021-08-24 NOTE — ED PROVIDER NOTES
Chief complaint : covid concerns    HISTORY OF PRESENT ILLNESS :  Location : diffuse    Quality : aching and fever    Quantity : constant    Timing : Friday 8/20, 4 days ago    Severity : moderate    Context : family members are covid +, pt has copd    Alleviating / exacerbating factors : s/p vax    Associated Symptoms : see triage note and r.o.s.              Past Medical History:   Diagnosis Date    Attention deficit disorder without mention of hyperactivity     Bronchitis     Chronic obstructive pulmonary disease (HCC)     Depressive disorder, not elsewhere classified     Diaphragmatic hernia without mention of obstruction or gangrene     Diverticulitis of colon (without mention of hemorrhage)(562.11)     Endometriosis     Esophageal reflux     Fibromyalgia     Folate deficiency     Generalized hypermobility of joints     GERD (gastroesophageal reflux disease)     Hypertension     Hypothyroidism     Irritable bowel syndrome     Leg pain, bilateral 12/9/2015    Lupus ([facility] [address]     Migraine, unspecified, without mention of intractable migraine without mention of status migrainosus     Regional enteritis of large intestine ([facility] [address]     Tobacco use disorder     Vitamin D deficiency        Past Surgical History:   Procedure Laterality Date    HX COLONOSCOPY  12/2016    HX COLONOSCOPY  01/23/2018    diverticulosis, int and ext hemorrhoids    HX ENDOSCOPY  01/23/2018    polyp in duodenum, chronic gastritis with hemorrhage    HX HYSTERECTOMY  4/2014    HX LAP CHOLECYSTECTOMY  01/04/2017         Family History:   Problem Relation Age of Onset    Heart Attack Father     Diabetes Paternal Grandfather     Hypertension Mother     Diabetes Mother     Thyroid Disease Mother     Psychiatric Disorder Mother         anxiety    Lung Disease Other         aunt with \"rare type of lung disorder, required Oxygen\"    Seizures Child        Social History     Socioeconomic History    Marital status:  Spouse name: Not on file    Number of children: Not on file    Years of education: Not on file    Highest education level: Not on file   Occupational History    Not on file   Tobacco Use    Smoking status: Current Every Day Smoker     Packs/day: 0.50     Years: 30.00     Pack years: 15.00    Smokeless tobacco: Never Used    Tobacco comment: She said that she does not smoke all the time. 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 She is . She has always lived in [place]. No history of toxic industrial, environmental, asbestos or TB exposure. Social Determinants of Health     Financial Resource Strain:     Difficulty of Paying Living Expenses:    Food Insecurity:     Worried About Running Out of Food in the Last Year:     [address] in the Last Year:    Transportation Needs:     Lack of Transportation (Medical):  Lack of Transportation (Non-Medical):    Physical Activity:     Days of Exercise per Week:     Minutes of Exercise per Session:    Stress:     Feeling of Stress :    Social Connections:     Frequency of Communication with Friends and Family:     Frequency of Social Gatherings with Friends and Family:     Attends Hindu Services:     Active Member of Clubs or Organizations:     Attends Club or Organization Meetings:     Marital Status:    Intimate Partner Violence:     Fear of Current or Ex-Partner:     Emotionally Abused:     Physically Abused:     Sexually Abused:           ALLERGIES: Sulfa (sulfonamide antibiotics), Bactrim [sulfamethoprim], Ciprofloxacin, Ibuprofen, Penicillins, and Prozac [fluoxetine]    Review of Systems   Constitutional: Positive for activity change, chills, fatigue and fever. HENT: Positive for congestion. Negative for rhinorrhea and sore throat. Eyes: Negative for discharge and redness. Respiratory: Positive for cough and shortness of breath. Cardiovascular: Negative for chest pain and palpitations. Gastrointestinal: Positive for diarrhea and nausea. Negative for abdominal pain and vomiting. Genitourinary: Negative for difficulty urinating and dysuria. Musculoskeletal: Positive for arthralgias and back pain. Skin: Negative for rash. Neurological: Positive for headaches. Negative for dizziness. All other systems reviewed and are negative. Vitals:    08/24/21 1925   BP: 129/84   Pulse: 100   Resp: 20   Temp: (!) 96.5 °F (35.8 °C)   SpO2: 95%   Weight: 70.8 kg (156 lb)   Height: 5' 3\" (1.6 m)            Physical Exam  Vitals and nursing note reviewed. Constitutional:       General: She is not in acute distress. Appearance: Normal appearance. She is well-developed. She is ill-appearing. She is not toxic-appearing or diaphoretic. HENT:      Head: Normocephalic and atraumatic. Right Ear: External ear normal.      Left Ear: External ear normal.      Mouth/Throat:      Mouth: Mucous membranes are moist.      Pharynx: Oropharynx is clear. No oropharyngeal exudate or posterior oropharyngeal erythema. Eyes:      General: No scleral icterus. Right eye: No discharge. Left eye: No discharge. Extraocular Movements: Extraocular movements intact. Conjunctiva/sclera: Conjunctivae normal.      Pupils: Pupils are equal, round, and reactive to light. Neck:      Thyroid: No thyromegaly. Trachea: Trachea normal.   Cardiovascular:      Rate and Rhythm: Normal rate and regular rhythm. Heart sounds: Normal heart sounds. No murmur heard. No gallop. Pulmonary:      Effort: Pulmonary effort is normal. No respiratory distress. Breath sounds: Rales present. No wheezing. Abdominal:      General: Bowel sounds are normal.      Palpations: Abdomen is soft. There is no hepatomegaly, splenomegaly or pulsatile mass. Tenderness: There is no abdominal tenderness. There is no guarding. Musculoskeletal:         General: Normal range of motion. Cervical back: Normal range of motion and neck supple. Normal range of motion. Lymphadenopathy:      Cervical: No cervical adenopathy. Skin:     General: Skin is warm and dry. Neurological:      General: No focal deficit present. Mental Status: She is alert and oriented to person, place, and time. Mental status is at baseline. Motor: No abnormal muscle tone. Comments: cni 2-12 grossly   Psychiatric:         Mood and Affect: Mood normal.         Behavior: Behavior normal.          MDM  Number of Diagnoses or Management Options  Diagnosis management comments: Medical decision making note:  Probable Covid vaccine failure, will perform a rapid test triage as she would warrant monoclonal antibodies based on her COPD and symptomatology. This concludes the \"medical decision making note\" part of this emergency department visit note.          Amount and/or Complexity of Data Reviewed  Clinical lab tests: reviewed and ordered  Tests in the radiology section of CPT®: ordered and reviewed  Decide to obtain previous medical records or to obtain history from someone other than the patient: yes    Risk of Complications, Morbidity, and/or Mortality  Presenting problems: moderate  Diagnostic procedures: low  Management options: low    Patient Progress  Patient progress: improved         Procedures

## 2021-08-24 NOTE — ED TRIAGE NOTES
Arrives with face mask in place. Reports fever/chills, sore throat, runny nose, productive cough with green sputum, shortness of breath, diarrhea, loss of taste, head pain. Onset Sunday. Reports temp 105 earlier today. Attempted tylenol 1 hour pta. Reports exposure to COVID last week. Received Lakeisha Pac and Shweta vaccine 2 months ago. +smoker. Hx COPD. Dr Peck  out to triage to assess.

## 2021-08-25 NOTE — ED NOTES
I have reviewed discharge instructions with the patient. The patient verbalized understanding. Patient left ED via Discharge Method: ambulatory to Home with self. Opportunity for questions and clarification provided. Patient given 5 scripts. To continue your aftercare when you leave the hospital, you may receive an automated call from our care team to check in on how you are doing. This is a free service and part of our promise to provide the best care and service to meet your aftercare needs.  If you have questions, or wish to unsubscribe from this service please call [phone]. Thank you for Choosing our [facility] Emergency Department.

## 2021-08-25 NOTE — DISCHARGE INSTRUCTIONS
Zithromax for cough and fevers  Tessalon for cough  Inhaler 2 puffs every 6 hours as needed  Lomotil for the diarrhea  Again ordered Zofran as needed for nausea  Alternate 4 ibuprofen every 8 hours with 2 extra-strength tylenol every 6 hours  Return if worse

## 2021-10-24 PROBLEM — Z91.51 HISTORY OF ATTEMPTED SUICIDE: Status: ACTIVE | Noted: 2019-10-22

## 2021-10-24 PROBLEM — M35.9 UNDIFFERENTIATED CONNECTIVE TISSUE DISEASE (HCC): Status: ACTIVE | Noted: 2020-03-21

## 2022-03-18 PROBLEM — M35.9 UNDIFFERENTIATED CONNECTIVE TISSUE DISEASE (HCC): Status: ACTIVE | Noted: 2020-03-21

## 2022-03-18 PROBLEM — G62.9 NEUROPATHY: Status: ACTIVE | Noted: 2019-02-27

## 2022-03-18 PROBLEM — F33.1 MODERATE EPISODE OF RECURRENT MAJOR DEPRESSIVE DISORDER (HCC): Status: ACTIVE | Noted: 2018-02-21

## 2022-03-18 PROBLEM — Z91.51 HISTORY OF ATTEMPTED SUICIDE: Status: ACTIVE | Noted: 2019-10-22

## 2022-03-18 PROBLEM — L93.0 LUPUS ERYTHEMATOSUS: Status: ACTIVE | Noted: 2018-10-11

## 2022-03-19 PROBLEM — K20.90 ESOPHAGITIS: Status: ACTIVE | Noted: 2018-10-11

## 2022-03-19 PROBLEM — K58.0 IRRITABLE BOWEL SYNDROME WITH DIARRHEA: Status: ACTIVE | Noted: 2018-06-04

## 2022-03-19 PROBLEM — R20.2 NUMBNESS AND TINGLING: Status: ACTIVE | Noted: 2019-02-27

## 2022-03-19 PROBLEM — R20.0 NUMBNESS AND TINGLING: Status: ACTIVE | Noted: 2019-02-27

## 2022-03-20 PROBLEM — R19.7 DIARRHEA: Status: ACTIVE | Noted: 2018-10-11

## 2022-06-02 ENCOUNTER — TELEPHONE (OUTPATIENT)
Dept: SLEEP MEDICINE | Age: 52
End: 2022-06-02

## 2022-06-02 DIAGNOSIS — R94.2 DECREASED DIFFUSION CAPACITY: Primary | ICD-10-CM

## 2022-06-02 DIAGNOSIS — R06.02 SOB (SHORTNESS OF BREATH): ICD-10-CM

## 2022-06-02 NOTE — TELEPHONE ENCOUNTER
Spoke with the patient and notified her that I placed the CT order back in the system. Explained that we have converted to a new system and I have reviewed her chart and it looks like her TTE and VQ scan has been completed and she only needs to have the CT scan done prior to her appointment and then we will be all set. Patient understood and was provided with the number for Radiology scheduling. Patient stated that she will call them to get the CT scan scheduled before her appointment. No further questions or concerns were asked at this time.  // Miko Blair

## 2022-06-02 NOTE — TELEPHONE ENCOUNTER
Patient states that she missed her appointment in December. She is scheduled 6/30/22 and needs to have CT and any other tests he wanted her to have. Please call and advise her of anything she needs to have completed prior to appt.

## 2022-06-21 ENCOUNTER — HOSPITAL ENCOUNTER (OUTPATIENT)
Dept: CT IMAGING | Age: 52
Discharge: HOME | End: 2022-06-24
Payer: COMMERCIAL

## 2022-06-21 DIAGNOSIS — R06.02 SOB (SHORTNESS OF BREATH): ICD-10-CM

## 2022-06-21 DIAGNOSIS — R94.2 DECREASED DIFFUSION CAPACITY: ICD-10-CM

## 2022-06-21 PROCEDURE — 71250 CT THORAX DX C-: CPT

## 2022-07-11 ENCOUNTER — TELEPHONE (OUTPATIENT)
Dept: PULMONOLOGY | Age: 52
End: 2022-07-11

## 2022-07-11 DIAGNOSIS — J43.9 PULMONARY EMPHYSEMA, UNSPECIFIED EMPHYSEMA TYPE (HCC): ICD-10-CM

## 2022-07-11 DIAGNOSIS — J43.9 EMPHYSEMATOUS BLEB (HCC): Primary | ICD-10-CM

## 2022-07-11 NOTE — TELEPHONE ENCOUNTER
----- Message from Sumit Barajas MD sent at 7/1/2022  5:47 PM EDT -----  Can you let the patient know that her ct scan is stable. We will discuss it at her appt in a couple weeks. Thanks.    Sumit Barajas MD

## 2022-07-11 NOTE — LETTER
Order Requisition for April Jaclyn Logan  CSN: [id number]   Order Date:  Jul 15, 2022             Patient Information: April CRYSTAL Hannah       :  1970  Age:  47 y.o.   Sex:  F  Home Phone: [phone]  Work Phone:   SSN: [id number]  Address:  [facility] [address] [place] [address], [address] [place] [address] MRN:  [id number]  Facility MRN:  [id number]  PCP: Matilda Dia MD  PCP Phone: [phone]           Ordering Dept: Anna Regalado     Site: [facility]  Ph: [phone] Fax: [phone]  Address: [address] [place] [address] Ordering User: Shireen Sellers MA  Provider ID: [id number]  NPI:                 Test Ordered: Pulse oximetry, overnight [RT34]   Code: ER33   ORD #: [id number]  Associated Diagnosis: Pulmonary emphysema, unspecified emphysema type ([facility] [address] (J43.9 [ICD-10-CM])  Scheduling Instructions: Please send out Overnight Oximetry on Room Air  Please Fax results to: [phone]     Please use [address]    Priority  Routine Class  Clinic Performed      Order Status    Expected Date    Specimen Source    Collection Date    Collection Time    Occurrences Remaining    Interval         Electronically Signed By  Maria T Mueller MD  NPI:  [id number] Date  Jul 15, 2022  2:45 PM              Responsible Party Auther Deems Information     Guar-ActID   Relationship Account Type Home Phone   Ramya King [id number] [address] [place] [address] / [place] [address] [address] [facility] Self P/F [phone]   Employer   Work Phone   NOT EMPLOYED , [employer] [facility]     Primary Insurance  Insurance/Subscriber ID:  [id number]  [address] [place] [address] Drive Name:  IGNACIO HANNAH              Relationship to Patient: SpouseSigned ABN: N    Payor Name:  [employer]   Plan:  [employer]   Group: [facility]  Worker's Comp Date of Injury:

## 2022-07-12 ENCOUNTER — OFFICE VISIT (OUTPATIENT)
Dept: PULMONOLOGY | Age: 52
End: 2022-07-12
Payer: COMMERCIAL

## 2022-07-12 VITALS
BODY MASS INDEX: 29.06 KG/M2 | HEART RATE: 95 BPM | WEIGHT: 164 LBS | TEMPERATURE: 98 F | HEIGHT: 63 IN | OXYGEN SATURATION: 98 % | SYSTOLIC BLOOD PRESSURE: 140 MMHG | RESPIRATION RATE: 20 BRPM | DIASTOLIC BLOOD PRESSURE: 80 MMHG

## 2022-07-12 DIAGNOSIS — Z87.891 HISTORY OF NICOTINE DEPENDENCE: ICD-10-CM

## 2022-07-12 DIAGNOSIS — G47.34 NOCTURNAL HYPOXIA: ICD-10-CM

## 2022-07-12 DIAGNOSIS — R91.1 LUNG NODULE: ICD-10-CM

## 2022-07-12 DIAGNOSIS — J43.2 CENTRILOBULAR EMPHYSEMA (HCC): Primary | ICD-10-CM

## 2022-07-12 DIAGNOSIS — Z12.9 SCREENING FOR CANCER: ICD-10-CM

## 2022-07-12 DIAGNOSIS — Z87.891 PERSONAL HISTORY OF TOBACCO USE, PRESENTING HAZARDS TO HEALTH: ICD-10-CM

## 2022-07-12 PROCEDURE — G0296 VISIT TO DETERM LDCT ELIG: HCPCS | Performed by: INTERNAL MEDICINE

## 2022-07-12 PROCEDURE — 99406 BEHAV CHNG SMOKING 3-10 MIN: CPT | Performed by: INTERNAL MEDICINE

## 2022-07-12 PROCEDURE — 99214 OFFICE O/P EST MOD 30 MIN: CPT | Performed by: INTERNAL MEDICINE

## 2022-07-12 RX ORDER — BUPROPION HYDROCHLORIDE 150 MG/1
TABLET, EXTENDED RELEASE ORAL
Qty: 60 TABLET | Refills: 2 | Status: SHIPPED | OUTPATIENT
Start: 2022-07-12

## 2022-07-12 RX ORDER — ALBUTEROL SULFATE 90 UG/1
2 AEROSOL, METERED RESPIRATORY (INHALATION) EVERY 4 HOURS PRN
Qty: 1 EACH | Refills: 11 | Status: SHIPPED | OUTPATIENT
Start: 2022-07-12

## 2022-07-12 RX ORDER — FLUTICASONE FUROATE, UMECLIDINIUM BROMIDE AND VILANTEROL TRIFENATATE 100; 62.5; 25 UG/1; UG/1; UG/1
1 POWDER RESPIRATORY (INHALATION) DAILY
Qty: 1 EACH | Refills: 11 | Status: SHIPPED | OUTPATIENT
Start: 2022-07-12

## 2022-07-12 ASSESSMENT — ENCOUNTER SYMPTOMS
DIARRHEA: 0
WHEEZING: 0
CONSTIPATION: 0
SHORTNESS OF BREATH: 0
COUGH: 0
CHEST TIGHTNESS: 0
APNEA: 0
VOMITING: 0
ABDOMINAL PAIN: 0
NAUSEA: 0

## 2022-07-12 NOTE — PROGRESS NOTES
[address] Franchesca Holden. [address], [address] [facility]  [phone]    Patient Name:  Curlene Mcardle  YOB: 1970    Office Visit 7/12/2022    CHIEF COMPLAINT:    Chief Complaint   Patient presents with    Other     Abnormal results of pulmonary function studies    COPD         HISTORY OF PRESENT ILLNESS:    I had the pleasure of seeing Ms. Susan Newberry in our clinic today. Ms. Hanny Kyle is a 47 y.o. female who presents with a history of bipolar, tobacco abuse, centrilobular emphysema.      She states she was in rehab in [facility], per her to work on her bipolar. She is on norco and was told she would have to go through detox first.   Bulmaroterry Gilliam to detox to get off norco first. While there she was given something \"for her nerves\" and it made her feel nauseous. She was then taken to [facility] hospital with [facility]. While there was told had small PE in the R lung. Started on blood thinner but she did not feel like this was necessary. Eventually left the hospital, did not continue anticoagulation and followed up in the ER here 3/16/21. Repeat CT PE done with no PE. Previously had negative LE dopplers, and d dimer was checked and was normal so was not sent home with any anticoagulation. Tried spiriva and prn albuterol with no change in symptoms of dyspnea. PFT's with isolated reduction in DLCO.      She returns today for follow up appointment. Since her last appointment we obtained TTE (Aug 2021 with normal EF and no abnormalities) and VQ scan (July 2021 normal). Her spiriva was changed to [facility]. She had a repeat annual screening CT June 2022 which was stable with RML nodule and emphysema. She states that the [facility] did not seem to help her dyspnea. She denies any new or worse symptoms. Some cough, mild. She is asking for help with stopping smoking. Currently smoking 1.5 ppd. She states she had her GB removed about 10 years ago.  Not long after she began having upper abd/lower chest pains. She points to a spot at the LUQ/under her left breast. She is on prilosec.                Past Medical History:   Diagnosis Date    Attention deficit disorder without mention of hyperactivity     Bronchitis     Chronic obstructive pulmonary disease (HCC)     Depressive disorder, not elsewhere classified     Diaphragmatic hernia without mention of obstruction or gangrene     Diverticulitis of colon (without mention of hemorrhage)(562.11)     Endometriosis     Esophageal reflux     Fibromyalgia     Folate deficiency     Generalized hypermobility of joints     GERD (gastroesophageal reflux disease)     Hypertension     Hypothyroidism     Irritable bowel syndrome     Leg pain, bilateral 12/9/2015    Lupus ([facility])     Migraine, unspecified, without mention of intractable migraine without mention of status migrainosus     Regional enteritis of large intestine ([address]     Tobacco use disorder     Vitamin D deficiency        Patient Active Problem List   Diagnosis    Neuropathy    Leg pain, bilateral    Undifferentiated connective tissue disease ([address]    Lupus erythematosus    Fibromyalgia    History of attempted suicide    Hypertension    Folate deficiency    Moderate episode of recurrent major depressive disorder ([address]    Diverticulitis of colon (without mention of hemorrhage)(562.11)    Pulmonary emphysema (HCC)    Hypothyroidism, adult    Numbness and tingling    Calculus of gallbladder    Esophagitis    Hiatal hernia    Irritable bowel syndrome with diarrhea    Adult attention deficit disorder    Hx of tobacco use, presenting hazards to health    Irritable bowel syndrome    Esophageal reflux    Diarrhea       Past Surgical History:   Procedure Laterality Date    CHOLECYSTECTOMY, LAPAROSCOPIC  01/04/2017    COLONOSCOPY  12/2016    COLONOSCOPY  01/23/2018    diverticulosis, int and ext hemorrhoids    HYSTERECTOMY (CERVIX STATUS UNKNOWN)  4/2014    UPPER GASTROINTESTINAL ENDOSCOPY  01/23/2018    polyp in duodenum, chronic gastritis with hemorrhage       [unfilled]    Social History     Socioeconomic History    Marital status:      Spouse name: Not on file    Number of children: Not on file    Years of education: Not on file    Highest education level: Not on file   Occupational History    Not on file   Tobacco Use    Smoking status: Current Every Day Smoker     Packs/day: 0.50     Years: 35.00     Pack years: 17.50     Types: Cigarettes    Smokeless tobacco: Never Used    Tobacco comment: Quit smoking: She said that she does not smoke all the time. 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 She is . She has always lived in [address] [place] [address]. No history of toxic industrial, environmental, asbestos or TB exposure. Social Determinants of Health     Financial Resource Strain:     Difficulty of Paying Living Expenses: Not on file   Food Insecurity:     Worried About Running Out of Food in the Last Year: Not on file    Tamara of Food in the Last Year: Not on file   Transportation Needs:     Lack of Transportation (Medical): Not on file    Lack of Transportation (Non-Medical):  Not on file   Physical Activity:     Days of Exercise per Week: Not on file    Minutes of Exercise per Session: Not on file   Stress:     Feeling of Stress : Not on file   Social Connections:     Frequency of Communication with Friends and Family: Not on file    Frequency of Social Gatherings with Friends and Family: Not on file    Attends Confucianism Services: Not on file    Active Member of Clubs or Organizations: Not on file    Attends Club or Organization Meetings: Not on file   Mateus Simon Marital Status: Not on file   Intimate Partner Violence:     Fear of Current or Ex-Partner: Not on file    Emotionally Abused: Not on file    Physically Abused: Not on file    Sexually Abused: Not on file   Housing Stability:     Unable to Pay for Housing in the Last Year: Not on file    Number of Places Lived in the Last Year: Not on file    Unstable Housing in the Last Year: Not on file       Family History   Problem Relation Age of Onset    Thyroid Disease Mother     Diabetes Mother     Hypertension Mother     Diabetes Paternal Grandfather     Heart Attack Father     Psychiatric Disorder Mother         anxiety    Seizures Child     Lung Disease Other         aunt with \"rare type of lung disorder, required Oxygen\"       Allergies   Allergen Reactions    Ciprofloxacin Shortness Of Breath    Sulfa Antibiotics Anaphylaxis    Ibuprofen Swelling     In her joints    Penicillins Other (See Comments)     Stomach upset    Sulfamethoxazole-Trimethoprim Other (See Comments)     Burning inside. 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  diphenoxylate-atropine (LOMOTIL) 2.5-0.025 MG per tablet Take 2 tablets by mouth 4 times daily as needed.     folic acid (FOLVITE) 1 MG tablet Take 1 mg by mouth daily    ondansetron (ZOFRAN-ODT) 8 MG TBDP disintegrating tablet Take 8 mg by mouth every 8 hours as needed    pilocarpine (SALAGEN) 5 MG tablet Take 5 mg by mouth 3 times daily    predniSONE (DELTASONE) 5 MG tablet Take 5 mg by mouth daily    promethazine (PHENERGAN) 25 MG tablet Take 25 mg by mouth every 6 hours as needed    amphetamine-dextroamphetamine (ADDERALL) 30 MG tablet Take 1 tablet by mouth 2 times daily. (Patient not taking: Reported on 7/12/2022)    buPROPion (WELLBUTRIN XL) 300 MG extended release tablet Take 300 mg by mouth (Patient not taking: Reported on 7/12/2022)    Hyoscyamine Sulfate SL 0.125 MG SUBL Take 0.125 mg by mouth every 6 hours as needed (Patient not taking: Reported on 7/12/2022)    omeprazole (PRILOSEC) 20 MG delayed release capsule Take 20 mg by mouth daily (Patient not taking: Reported on 7/12/2022)     No current facility-administered medications for this visit. REVIEW OF SYSTEMS:    Review of Systems   Constitutional: Negative for chills, fatigue and unexpected weight change. Respiratory: Negative for apnea, cough, chest tightness, shortness of breath and wheezing. Cardiovascular: Negative for chest pain, palpitations and leg swelling. Gastrointestinal: Negative for abdominal pain, constipation, diarrhea, nausea and vomiting. Neurological: Negative for dizziness, tremors, seizures, weakness and headaches. PHYSICAL EXAM:  Vitals:    07/12/22 1040   BP: (!) 140/80   Pulse: 95   Resp: 20   Temp: 98 °F (36.7 °C)   SpO2: 98%       GENERAL APPEARANCE:  The patient is normal weight and in no respiratory distress. HEENT:  PERRL. Conjunctivae unremarkable. Nasal mucosa is without epistaxis, exudate, or polyps. Gums and dentition are unremarkable. There is no oropharyngeal narrowing. TMs are clear. NECK/LYMPHATIC:  Symmetrical with no elevation of jugular venous pulsation. Trachea midline. No thyroid enlargement. No cervical adenopathy. LUNGS:  Normal respiratory effort with symmetrical lung expansion. Breath sounds are clear bilaterally. HEART:  There is a regular rate and rhythm. No murmur, rub, or gallop. ABDOMEN:  Soft and non-tender. No hepatosplenomegaly. Bowel sounds are normal.    NEURO/PSYCH:  The patient is alert and oriented to person, place, and time. Memory appears intact and mood is normal.  No gross sensorimotor deficits are present. MS/SKIN:  There is no edema in the lower extremities. No rashes, bruises, lesions, ulcers visible. DIAGNOSTIC TESTS:  CXR:    XR CHEST (2 VW) 08/24/2021    Narrative  PA LATERAL CHEST  8/24/2021 7:49 PM    HISTORY:  COUGH  ; . Reports fever/chills, sore throat, runny nose, productive  cough with green sputum, shortness of breath, diarrhea, loss of taste, head  pain. Onset Sunday. Reports temp 105 earlier today. Attempted tylenol 1 hour  pta. Reports exposure to COVID last -week. Received Dejan Senters and Tamara vaccine  2 months ago. +smoker. Hx COPD    COMPARISON: July 22, 2021    FINDINGS:  The heart size is within normal limits. There is no lobar  consolidation, pleural effusions or pulmonary edema. Impression  No consolidation. CT WITHOUT CONTRAST:    CT CHEST WO CONTRAST 06/21/2022    Narrative  CT CHEST    INDICATION: Shortness of breath and cough. History of pulmonary nodule. Multiple axial images were obtained through the chest without IV contrast.  Radiation dose reduction techniques were used for this study: All CT scans  performed at this facility use one or all of the following: Automated exposure  control, adjustment of the mA and/or kVp according to patient's size, iterative  reconstruction. COMPARISON: 03/16/2021    FINDINGS:  - LUNGS: Stable 3 mm nodule in the anterior right middle lobe. No new or  enlarging pulmonary masses. No infiltrates or effusions. Stable emphysematous  change in the lung apices.  - MEDIASTINUM/AXILLA: No significant adenopathy.    - HEART/VESSELS: Normal.  - CHEST WALL/BONES: Normal.  - UPPER ABDOMEN: No acute findings. Impression  1. Stable benign-appearing nodule in the right middle lobe. 2.  Stable emphysema. CT WITH CONTRAST:    No results found for this or any previous visit from the past 365 days.         CT HIGH RES:    No results found for this or any previous visit from the past 365 days. CT PE PROTOCOL:    No results found for this or any previous visit from the past 365 days. LDCT SCREENING:    No results found for this or any previous visit from the past 365 days. PET SCAN:    No results found for this or any previous visit from the past 365 days. Exercise oximetry:      Spirometry:     Date:    7/8/21             FVC    3.51-98             FEV1    2.75-95             FEV1/FVC    78             FEF 25-75%    2.61-88             Bronchodilator Response                 TLC    5.22-97             RV    1.67-88             DLCO    15.3-56                  No flowsheet data found. Echo:  No valid procedures specified. ASSESSMENT:  (Medical Decision Making)   Diagnosis Orders   1. Centrilobular emphysema (HCC)     2. Lung nodule     3. Personal history of tobacco use, presenting hazards to health     4. Nocturnal hypoxia          Pt with ongoing tobacco abuse, down to 3-5 cigarettes a day, centrilobular emphysema on CT.      Questionable PE, not demonstrated on most recent CT and I could not see it on outside imaging. cPFT's without significant obstruction but reduced DLCO. Associated with DAVIS. Not improve with spiriva. PLAN:  -will start Trelegy 100.   -will try zyban and nicotine replacement for smoking cessation. Total smoking cessation is advised. Patient's tobacco use confirmed as documented in the medical record. Discussed various smoking cessation products including pills, patches, inhaler, gum, weaning self off, \"cold turkey\", and smoking cessation classes. Discussed also with patient disease risk of ongoing smoking including CVA, lung cancer, and heart disease. At this point, patient's commitment to quitting is high. 5 minutes were spent counseling patient regarding tobacco cessation. -will set up JONO on RA to see if she needs 2L as her sleep study suggested in May 2021.   -next screening CT June 2023.

## 2022-07-13 ENCOUNTER — TELEPHONE (OUTPATIENT)
Dept: INTERNAL MEDICINE CLINIC | Facility: CLINIC | Age: 52
End: 2022-07-13

## 2022-07-15 NOTE — TELEPHONE ENCOUNTER
Spoke with the patient and notified her that the CT scan was stable. I fixed the LDCT screening order for her to have the scan for next June and will notify [employer] to please contact patient to get JONO started.  // Ramiro Kendall

## 2022-08-30 ENCOUNTER — TELEPHONE (OUTPATIENT)
Dept: PULMONOLOGY | Age: 52
End: 2022-08-30

## 2022-08-30 NOTE — TELEPHONE ENCOUNTER
Patient has disabled daughter and it is hard for her to get out of the house 2 days in a roll. To  the lion test from [address] [place] [address] and then take it back and if she doesn't she will be charged again for it .  She wants to know is there anyone else that will drop off and

## 2022-08-31 NOTE — TELEPHONE ENCOUNTER
Spoke with the patient and notified her that I have talked with [address] [place] [address] and that they will go ahead and mail the overnight test to her house. Patient was thankful and did not have any further questions or concerns at this time.  // Anastasia Zhu

## 2022-09-16 ENCOUNTER — TELEPHONE (OUTPATIENT)
Dept: PULMONOLOGY | Age: 52
End: 2022-09-16

## 2022-09-16 NOTE — TELEPHONE ENCOUNTER
----- Message from Inge Pastrana MD sent at 9/15/2022 12:42 PM EDT -----  Could you let the patient know that she did desaturate on her over nigth oxygen test. She should continue to use 2L O2 at night. Thanks.    Inge Pastrana MD

## 2022-09-16 NOTE — TELEPHONE ENCOUNTER
Spoke with the patient in regards to their Overnight results, explained per Dr. Eyal Ferrer that the JONO showed that she did desaturate on Room Air and that he would like for her to be set up on 2 lpm qhs. Patient was agreeable with this and a order has been established and sent via [employer] to Dunia Saez. // Pradeep EDWARDS

## 2023-07-20 ENCOUNTER — COMMUNITY OUTREACH (OUTPATIENT)
Dept: FAMILY MEDICINE CLINIC | Facility: CLINIC | Age: 53
End: 2023-07-20

## 2023-07-20 NOTE — PROGRESS NOTES
Patient's HM shows they are overdue for [address] and  files searched without success.

## 2025-03-30 ENCOUNTER — HOSPITAL ENCOUNTER (EMERGENCY)
Age: 55
Discharge: HOME | End: 2025-03-30

## 2025-03-30 ENCOUNTER — APPOINTMENT (OUTPATIENT)
Dept: CT IMAGING | Age: 55
End: 2025-03-30

## 2025-03-30 VITALS
TEMPERATURE: 98 F | SYSTOLIC BLOOD PRESSURE: 140 MMHG | RESPIRATION RATE: 16 BRPM | WEIGHT: 153 LBS | OXYGEN SATURATION: 97 % | BODY MASS INDEX: 27.11 KG/M2 | HEIGHT: 63 IN | HEART RATE: 99 BPM | DIASTOLIC BLOOD PRESSURE: 98 MMHG

## 2025-03-30 DIAGNOSIS — S09.90XA INJURY OF HEAD, INITIAL ENCOUNTER: Primary | ICD-10-CM

## 2025-03-30 DIAGNOSIS — S00.83XA CONTUSION OF FACE, INITIAL ENCOUNTER: ICD-10-CM

## 2025-03-30 PROCEDURE — 99284 EMERGENCY DEPT VISIT MOD MDM: CPT

## 2025-03-30 PROCEDURE — 6370000000 HC RX 637 (ALT 250 FOR IP): Performed by: NURSE PRACTITIONER

## 2025-03-30 PROCEDURE — 70450 CT HEAD/BRAIN W/O DYE: CPT

## 2025-03-30 PROCEDURE — 70486 CT MAXILLOFACIAL W/O DYE: CPT

## 2025-03-30 RX ORDER — TRAMADOL HYDROCHLORIDE 50 MG/1
50 TABLET ORAL
Status: COMPLETED | OUTPATIENT
Start: 2025-03-30 | End: 2025-03-30

## 2025-03-30 RX ADMIN — TRAMADOL HYDROCHLORIDE 50 MG: 50 TABLET, COATED ORAL at 00:33

## 2025-03-30 ASSESSMENT — LIFESTYLE VARIABLES
HOW MANY STANDARD DRINKS CONTAINING ALCOHOL DO YOU HAVE ON A TYPICAL DAY: PATIENT DOES NOT DRINK
HOW OFTEN DO YOU HAVE A DRINK CONTAINING ALCOHOL: NEVER

## 2025-03-30 ASSESSMENT — PAIN DESCRIPTION - LOCATION: LOCATION: HEAD

## 2025-03-30 ASSESSMENT — PAIN SCALES - GENERAL
PAINLEVEL_OUTOF10: 5
PAINLEVEL_OUTOF10: 8

## 2025-03-30 ASSESSMENT — PAIN - FUNCTIONAL ASSESSMENT: PAIN_FUNCTIONAL_ASSESSMENT: 0-10

## 2025-03-30 ASSESSMENT — PAIN DESCRIPTION - DESCRIPTORS: DESCRIPTORS: ACHING

## 2025-03-30 NOTE — ED TRIAGE NOTES
Patient arrives to the ER via POV. Patient states yesterday(Saturday 4am) her daughter was kicking off new steal toed shoes when it landed hitting her in right eye. Patient has bruising to right eye. Patient unknown if there was LOC. Patient endorses nausea. Patient denies blood thinner.

## 2025-03-30 NOTE — ED PROVIDER NOTES
Emergency Department Provider Note       PCP: Jeannette, Pcp   Age: 54 y.o.   Sex: female     DISPOSITION Decision To Discharge 03/30/2025 01:19:57 AM    ICD-10-CM    1. Injury of head, initial encounter  S09.90XA       2. Contusion of face, initial encounter  S00.83XA           Medical Decision Making     54-year-old female who presents to the emergency department today with complaint of a headache after a head injury that occurred about 21 hours ago.  She appears in no acute distress.  No focal neurological deficits noted on exam.  EOMs normal.  Vision is grossly intact.  She does have bruising surrounding the eye.  No subconjunctival hemorrhage.  No foreign body sensation.  Imaging shows no acute process.  Conservative treatments for pain with over-the-counter Tylenol and ibuprofen encouraged.  Encouraged follow-up with PCP for recheck.  ER return precautions discussed.     1 acute, uncomplicated illness or injury.  Shared medical decision making was utilized in creating the patients health plan today.  I independently ordered and reviewed each unique test.           I interpreted the CT Scan no intracranial hemorrhage.              History     54-year-old female presents to the emergency department today with complaint of a right-sided headache.  She states that early Saturday morning, her daughter was kicking off her steel toe shoes when it went \"airborne\" and hit her on the right thigh.  She is unsure if she lost consciousness but believes that she may have for a few seconds.  She states she has had nausea.  She reports pain surrounding the eye.  She has no pain with eye movement.  She states that she did have a bit of bleeding from the eyelid afterwards.  She denies any vision loss.  She denies any foreign body sensation.    The history is provided by the patient.     Physical Exam     Vitals signs and nursing note reviewed:  Vitals:    03/30/25 0017 03/30/25 0122   BP: (!) 167/114 (!) 140/98   Pulse: (!) 106 99

## 2025-03-30 NOTE — DISCHARGE INSTRUCTIONS
Your CT scans today are normal.  You have no fractures.  Your exam is reassuring.  The symptoms will likely take some time to improve.  Take Tylenol or ibuprofen if needed.  Rest as needed.  Avoid screen time for the next few days.  Return to the emergency department if you develop any new, worsening, or concerning symptoms.